# Patient Record
Sex: MALE | Race: WHITE
[De-identification: names, ages, dates, MRNs, and addresses within clinical notes are randomized per-mention and may not be internally consistent; named-entity substitution may affect disease eponyms.]

---

## 2020-07-16 ENCOUNTER — HOSPITAL ENCOUNTER (OUTPATIENT)
Dept: HOSPITAL 100 - LABSPEC | Age: 65
Discharge: HOME | End: 2020-07-16
Payer: MEDICARE

## 2020-07-16 DIAGNOSIS — R97.20: Primary | ICD-10-CM

## 2020-07-16 PROCEDURE — G0416 PROSTATE BIOPSY, ANY MTHD: HCPCS

## 2020-07-16 PROCEDURE — 88305 TISSUE EXAM BY PATHOLOGIST: CPT

## 2022-05-27 ENCOUNTER — HOSPITAL ENCOUNTER (OUTPATIENT)
Dept: HOSPITAL 100 - MRI | Age: 67
Discharge: HOME | End: 2022-05-27
Payer: MEDICARE

## 2022-05-27 DIAGNOSIS — R97.20: Primary | ICD-10-CM

## 2022-05-27 PROCEDURE — 72197 MRI PELVIS W/O & W/DYE: CPT

## 2022-05-27 PROCEDURE — A9575 INJ GADOTERATE MEGLUMI 0.1ML: HCPCS

## 2022-05-28 LAB
CREATININE FINGERSTICK: 1.1 MG/DL (ref 0.7–1.3)
EGFR FINGERSTICK: > 60 ML/MIN (ref 60–?)

## 2023-02-15 PROBLEM — R35.1 NOCTURIA: Status: ACTIVE | Noted: 2023-02-15

## 2023-02-15 PROBLEM — G89.29 CHRONIC BACK PAIN: Status: ACTIVE | Noted: 2023-02-15

## 2023-02-15 PROBLEM — M54.17 LUMBOSACRAL RADICULOPATHY: Status: ACTIVE | Noted: 2023-02-15

## 2023-02-15 PROBLEM — M47.817 LUMBOSACRAL SPONDYLOSIS: Status: ACTIVE | Noted: 2023-02-15

## 2023-02-15 PROBLEM — H69.90 EUSTACHIAN TUBE DYSFUNCTION: Status: ACTIVE | Noted: 2023-02-15

## 2023-02-15 PROBLEM — M51.26 LUMBAR DISC HERNIATION: Status: ACTIVE | Noted: 2023-02-15

## 2023-02-15 PROBLEM — N52.9 ERECTILE DYSFUNCTION: Status: ACTIVE | Noted: 2023-02-15

## 2023-02-15 PROBLEM — I10 HYPERTENSION: Status: ACTIVE | Noted: 2023-02-15

## 2023-02-15 PROBLEM — R73.03 PREDIABETES: Status: ACTIVE | Noted: 2023-02-15

## 2023-02-15 PROBLEM — R97.20 ELEVATED PROSTATE SPECIFIC ANTIGEN (PSA): Status: ACTIVE | Noted: 2023-02-15

## 2023-02-15 PROBLEM — N40.0 BPH (BENIGN PROSTATIC HYPERPLASIA): Status: ACTIVE | Noted: 2023-02-15

## 2023-02-15 PROBLEM — E78.5 HYPERLIPIDEMIA: Status: ACTIVE | Noted: 2023-02-15

## 2023-02-15 PROBLEM — M54.9 CHRONIC BACK PAIN: Status: ACTIVE | Noted: 2023-02-15

## 2023-02-15 RX ORDER — PREDNISONE 10 MG/1
10 TABLET ORAL
COMMUNITY
Start: 2022-08-16 | End: 2023-04-10 | Stop reason: ALTCHOICE

## 2023-02-15 RX ORDER — LISINOPRIL 40 MG/1
40 TABLET ORAL DAILY
COMMUNITY
Start: 2020-03-09 | End: 2023-05-26 | Stop reason: SDUPTHER

## 2023-02-15 RX ORDER — LOVASTATIN 20 MG/1
20 TABLET ORAL DAILY
COMMUNITY
Start: 2020-03-09 | End: 2023-05-26 | Stop reason: SDUPTHER

## 2023-02-15 RX ORDER — AMLODIPINE BESYLATE 2.5 MG/1
2.5 TABLET ORAL DAILY
COMMUNITY
Start: 2022-07-11 | End: 2023-08-14

## 2023-02-15 RX ORDER — TADALAFIL 20 MG/1
10 TABLET ORAL DAILY
COMMUNITY
Start: 2021-04-13 | End: 2024-01-17 | Stop reason: ALTCHOICE

## 2023-04-07 LAB
ANION GAP IN SER/PLAS: 11 MMOL/L (ref 10–20)
CALCIUM (MG/DL) IN SER/PLAS: 9.2 MG/DL (ref 8.6–10.3)
CARBON DIOXIDE, TOTAL (MMOL/L) IN SER/PLAS: 28 MMOL/L (ref 21–32)
CHLORIDE (MMOL/L) IN SER/PLAS: 105 MMOL/L (ref 98–107)
CREATININE (MG/DL) IN SER/PLAS: 1.08 MG/DL (ref 0.5–1.3)
ESTIMATED AVERAGE GLUCOSE FOR HBA1C: 143 MG/DL
GFR MALE: 75 ML/MIN/1.73M2
GLUCOSE (MG/DL) IN SER/PLAS: 118 MG/DL (ref 74–99)
HEMOGLOBIN A1C/HEMOGLOBIN TOTAL IN BLOOD: 6.6 %
POTASSIUM (MMOL/L) IN SER/PLAS: 4.5 MMOL/L (ref 3.5–5.3)
SODIUM (MMOL/L) IN SER/PLAS: 139 MMOL/L (ref 136–145)
UREA NITROGEN (MG/DL) IN SER/PLAS: 15 MG/DL (ref 6–23)

## 2023-04-10 ENCOUNTER — OFFICE VISIT (OUTPATIENT)
Dept: PRIMARY CARE | Facility: CLINIC | Age: 68
End: 2023-04-10
Payer: MEDICARE

## 2023-04-10 VITALS
DIASTOLIC BLOOD PRESSURE: 62 MMHG | SYSTOLIC BLOOD PRESSURE: 124 MMHG | HEART RATE: 64 BPM | HEIGHT: 69 IN | WEIGHT: 209.1 LBS | BODY MASS INDEX: 30.97 KG/M2

## 2023-04-10 DIAGNOSIS — I15.9 SECONDARY HYPERTENSION: Primary | ICD-10-CM

## 2023-04-10 DIAGNOSIS — E11.9 TYPE 2 DIABETES MELLITUS WITHOUT COMPLICATION, WITHOUT LONG-TERM CURRENT USE OF INSULIN (MULTI): ICD-10-CM

## 2023-04-10 DIAGNOSIS — E78.2 MIXED HYPERLIPIDEMIA: ICD-10-CM

## 2023-04-10 PROBLEM — R73.03 PREDIABETES: Status: RESOLVED | Noted: 2023-02-15 | Resolved: 2023-04-10

## 2023-04-10 PROBLEM — H69.90 EUSTACHIAN TUBE DYSFUNCTION: Status: RESOLVED | Noted: 2023-02-15 | Resolved: 2023-04-10

## 2023-04-10 PROCEDURE — 3074F SYST BP LT 130 MM HG: CPT | Performed by: INTERNAL MEDICINE

## 2023-04-10 PROCEDURE — 1036F TOBACCO NON-USER: CPT | Performed by: INTERNAL MEDICINE

## 2023-04-10 PROCEDURE — 3078F DIAST BP <80 MM HG: CPT | Performed by: INTERNAL MEDICINE

## 2023-04-10 PROCEDURE — 4010F ACE/ARB THERAPY RXD/TAKEN: CPT | Performed by: INTERNAL MEDICINE

## 2023-04-10 PROCEDURE — 1160F RVW MEDS BY RX/DR IN RCRD: CPT | Performed by: INTERNAL MEDICINE

## 2023-04-10 PROCEDURE — 1159F MED LIST DOCD IN RCRD: CPT | Performed by: INTERNAL MEDICINE

## 2023-04-10 PROCEDURE — 99214 OFFICE O/P EST MOD 30 MIN: CPT | Performed by: INTERNAL MEDICINE

## 2023-04-10 PROCEDURE — 3044F HG A1C LEVEL LT 7.0%: CPT | Performed by: INTERNAL MEDICINE

## 2023-04-10 ASSESSMENT — ENCOUNTER SYMPTOMS
ARTHRALGIAS: 0
BACK PAIN: 1
UNEXPECTED WEIGHT CHANGE: 0
SHORTNESS OF BREATH: 0
WHEEZING: 0
BLOOD IN STOOL: 0
DIARRHEA: 0
NAUSEA: 0
FATIGUE: 0
ABDOMINAL PAIN: 0
VOMITING: 0
COUGH: 0
CHEST TIGHTNESS: 0
PALPITATIONS: 0

## 2023-04-10 ASSESSMENT — PATIENT HEALTH QUESTIONNAIRE - PHQ9
1. LITTLE INTEREST OR PLEASURE IN DOING THINGS: NOT AT ALL
2. FEELING DOWN, DEPRESSED OR HOPELESS: NOT AT ALL
SUM OF ALL RESPONSES TO PHQ9 QUESTIONS 1 AND 2: 0

## 2023-04-10 NOTE — PATIENT INSTRUCTIONS
-As we discussed your hemoglobin A1c came back at 6.6 which places you into the diagnostic realm of a type II diabetic.  We discussed having you see a dietitian for diabetic diet instruction and if you change your mind about that please give us a call.  Please read the handout we gave you today on instruction with diabetes.  As you know exercise and weight loss will help your condition significantly.  Also as a type II diabetic it is important that you get a dilated eye examination once a year.  We also will be checking a urine microalbumin study just prior to your next appointment so go to the lab with a full bladder.  We will also be doing a diabetic foot examination upon arrival  -Please read the handout we gave you today on measuring blood pressure and we will see you back at your next visit

## 2023-04-10 NOTE — ASSESSMENT & PLAN NOTE
-He will continue with lovastatin 20 mg daily  -Just prior to his next follow-up visit he will get a fasting lipid profile

## 2023-04-10 NOTE — PROGRESS NOTES
Subjective   Patient ID: Silver Edouard is a 67 y.o. male who presents for Med check.  HPI  He is here today for his 9-month checkup.  He states he spent time in [place] and just returned back to [place].  We conducted a review of systems and overall he reports feeling well with the exception of his chronic low back pain.  His blood pressure was very good for us today but his monitor is showing variable readings.  We discussed how he should sit and relax for 10 to 20 minutes prior to checking his blood pressure at home and I am giving him a handout today that goes over ways to monitor.  He will let us know if the numbers are still variable.  His blood pressure monitor is an [employer] unit and just a few years old.  We also discussed his most recent laboratory test results and unfortunately his hemoglobin A1c came back in the diabetic range of 6.6.  We discussed medication and we also discussed seeing a dietitian.  He states that he is going to make some significant lifestyle modifications including watching his diet more closely and exercising more regularly.  He would like to refrain from medication at this time and we will see what happens next time.  We did talk about some of the benefits of starting metformin including taking stress off the pancreas and also helping with weight loss.  He expresses understanding.  We also briefly discussed his history of elevated PSA and he does follow closely with his urologist.  He also sees Dr. Nelson, dermatologist for regular skin checkups.  We plan on seeing him back in 6 months and he knows to call sooner if things or not going well.  Review of Systems   Constitutional:  Negative for fatigue and unexpected weight change.   Respiratory:  Negative for cough, chest tightness, shortness of breath and wheezing.    Cardiovascular:  Negative for chest pain, palpitations and leg swelling.   Gastrointestinal:  Negative for abdominal pain, blood in stool, diarrhea, nausea and vomiting.    Musculoskeletal:  Positive for back pain. Negative for arthralgias.     Objective   Physical Exam  Vitals and nursing note reviewed.   Constitutional:       General: He is not in acute distress.     Appearance: Normal appearance.   HENT:      Head: Normocephalic and atraumatic.   Eyes:      Conjunctiva/sclera: Conjunctivae normal.   Cardiovascular:      Rate and Rhythm: Normal rate and regular rhythm.      Heart sounds: Normal heart sounds.   Pulmonary:      Effort: No respiratory distress.      Breath sounds: No wheezing.   Abdominal:      Palpations: Abdomen is soft.      Tenderness: There is no abdominal tenderness. There is no guarding.   Musculoskeletal:         General: No swelling. Normal range of motion.   Skin:     General: Skin is warm and dry.   Neurological:      General: No focal deficit present.      Mental Status: He is alert and oriented to person, place, and time.   Psychiatric:         Behavior: Behavior normal.       Recent Results (from the past 168 hour(s))   Basic Metabolic Panel    Collection Time: 04/07/23  9:36 AM   Result Value Ref Range    Glucose 118 (H) 74 - 99 mg/dL    Sodium 139 136 - 145 mmol/L    Potassium 4.5 3.5 - 5.3 mmol/L    Chloride 105 98 - 107 mmol/L    Bicarbonate 28 21 - 32 mmol/L    Anion Gap 11 10 - 20 mmol/L    Urea Nitrogen 15 6 - 23 mg/dL    Creatinine 1.08 0.50 - 1.30 mg/dL    GFR MALE 75 >90 mL/min/1.73m2    Calcium 9.2 8.6 - 10.3 mg/dL   Hemoglobin A1C    Collection Time: 04/07/23  9:36 AM   Result Value Ref Range    Hemoglobin A1C 6.6 (A) %    Estimated Average Glucose 143 MG/DL       Assessment/Plan   Problem List Items Addressed This Visit          Circulatory    Hypertension - Primary     -Blood pressure was good today p.o.  -I am giving him a handout on how to monitor at home  -He will continue with amlodipine 2.5 mg daily         Relevant Orders    Follow Up In Primary Care    Basic Metabolic Panel       Endocrine/Metabolic    Type 2 diabetes mellitus  without complication, without long-term current use of insulin (CMS/[facility])     -His hemoglobin A1c was 6.6 this time which places him as a newly diagnosed type II diabetic  -I am giving him a handout that explains treatment  -We talked about seeing a dietitian for diabetic education  -He would like to refrain from medication at this time  -He will come back in 6 months for reevaluation and we will get a hemoglobin A1c and urinalysis just prior to his next visit         Relevant Orders    Follow Up In Primary Care    Hemoglobin A1C    Albumin , Urine Random       Other    Hyperlipidemia     -He will continue with lovastatin 20 mg daily  -Just prior to his next follow-up visit he will get a fasting lipid profile         Relevant Orders    Follow Up In Primary Care    Lipid panel          Anita Faustin, DO

## 2023-04-10 NOTE — ASSESSMENT & PLAN NOTE
-Blood pressure was good today p.o.  -I am giving him a handout on how to monitor at home  -He will continue with amlodipine 2.5 mg daily

## 2023-04-10 NOTE — ASSESSMENT & PLAN NOTE
-His hemoglobin A1c was 6.6 this time which places him as a newly diagnosed type II diabetic  -I am giving him a handout that explains treatment  -We talked about seeing a dietitian for diabetic education  -He would like to refrain from medication at this time  -He will come back in 6 months for reevaluation and we will get a hemoglobin A1c and urinalysis just prior to his next visit

## 2023-05-26 DIAGNOSIS — E78.2 MIXED HYPERLIPIDEMIA: Primary | ICD-10-CM

## 2023-05-26 DIAGNOSIS — I15.9 SECONDARY HYPERTENSION: ICD-10-CM

## 2023-05-26 RX ORDER — LISINOPRIL 40 MG/1
40 TABLET ORAL DAILY
Qty: 90 TABLET | Refills: 1 | Status: SHIPPED | OUTPATIENT
Start: 2023-05-26 | End: 2023-11-22 | Stop reason: SDUPTHER

## 2023-05-26 RX ORDER — LOVASTATIN 20 MG/1
20 TABLET ORAL NIGHTLY
Qty: 90 TABLET | Refills: 1 | Status: SHIPPED | OUTPATIENT
Start: 2023-05-26 | End: 2023-11-22 | Stop reason: SDUPTHER

## 2023-08-14 DIAGNOSIS — I15.9 SECONDARY HYPERTENSION: ICD-10-CM

## 2023-08-14 RX ORDER — AMLODIPINE BESYLATE 2.5 MG/1
2.5 TABLET ORAL DAILY
Qty: 90 TABLET | Refills: 1 | Status: SHIPPED | OUTPATIENT
Start: 2023-08-14 | End: 2024-02-14 | Stop reason: SDUPTHER

## 2023-10-09 ENCOUNTER — APPOINTMENT (OUTPATIENT)
Dept: PRIMARY CARE | Facility: CLINIC | Age: 68
End: 2023-10-09
Payer: MEDICARE

## 2023-11-13 ENCOUNTER — APPOINTMENT (OUTPATIENT)
Dept: PRIMARY CARE | Facility: CLINIC | Age: 68
End: 2023-11-13
Payer: MEDICARE

## 2023-11-22 ENCOUNTER — LAB (OUTPATIENT)
Dept: LAB | Facility: LAB | Age: 68
End: 2023-11-22
Payer: MEDICARE

## 2023-11-22 DIAGNOSIS — E78.2 MIXED HYPERLIPIDEMIA: ICD-10-CM

## 2023-11-22 DIAGNOSIS — N40.0 BENIGN PROSTATIC HYPERPLASIA WITHOUT LOWER URINARY TRACT SYMPTOMS: Primary | ICD-10-CM

## 2023-11-22 DIAGNOSIS — I15.9 SECONDARY HYPERTENSION: ICD-10-CM

## 2023-11-22 LAB — PSA SERPL-MCNC: 8.97 NG/ML

## 2023-11-22 PROCEDURE — 36415 COLL VENOUS BLD VENIPUNCTURE: CPT

## 2023-11-22 PROCEDURE — 84153 ASSAY OF PSA TOTAL: CPT

## 2023-11-22 RX ORDER — LISINOPRIL 40 MG/1
40 TABLET ORAL DAILY
Qty: 90 TABLET | Refills: 1 | Status: SHIPPED | OUTPATIENT
Start: 2023-11-22 | End: 2024-05-15

## 2023-11-22 RX ORDER — LOVASTATIN 20 MG/1
20 TABLET ORAL NIGHTLY
Qty: 90 TABLET | Refills: 1 | Status: SHIPPED | OUTPATIENT
Start: 2023-11-22 | End: 2024-05-15

## 2023-12-04 ENCOUNTER — APPOINTMENT (OUTPATIENT)
Dept: PRIMARY CARE | Facility: CLINIC | Age: 68
End: 2023-12-04
Payer: MEDICARE

## 2024-01-17 ENCOUNTER — ANCILLARY PROCEDURE (OUTPATIENT)
Dept: RADIOLOGY | Facility: CLINIC | Age: 69
End: 2024-01-17
Payer: MEDICARE

## 2024-01-17 ENCOUNTER — OFFICE VISIT (OUTPATIENT)
Dept: PRIMARY CARE | Facility: CLINIC | Age: 69
End: 2024-01-17
Payer: MEDICARE

## 2024-01-17 VITALS
SYSTOLIC BLOOD PRESSURE: 132 MMHG | DIASTOLIC BLOOD PRESSURE: 78 MMHG | OXYGEN SATURATION: 98 % | HEART RATE: 76 BPM | HEIGHT: 69 IN | WEIGHT: 210 LBS | BODY MASS INDEX: 31.1 KG/M2

## 2024-01-17 DIAGNOSIS — M54.6 ACUTE MIDLINE THORACIC BACK PAIN: ICD-10-CM

## 2024-01-17 DIAGNOSIS — E78.2 MIXED HYPERLIPIDEMIA: ICD-10-CM

## 2024-01-17 DIAGNOSIS — M54.6 ACUTE MIDLINE THORACIC BACK PAIN: Primary | ICD-10-CM

## 2024-01-17 DIAGNOSIS — E11.9 TYPE 2 DIABETES MELLITUS WITHOUT COMPLICATION, WITHOUT LONG-TERM CURRENT USE OF INSULIN (MULTI): ICD-10-CM

## 2024-01-17 PROBLEM — M47.817 LUMBOSACRAL SPONDYLOSIS: Status: RESOLVED | Noted: 2023-02-15 | Resolved: 2024-01-17

## 2024-01-17 PROCEDURE — 1160F RVW MEDS BY RX/DR IN RCRD: CPT | Performed by: INTERNAL MEDICINE

## 2024-01-17 PROCEDURE — 4010F ACE/ARB THERAPY RXD/TAKEN: CPT | Performed by: INTERNAL MEDICINE

## 2024-01-17 PROCEDURE — 99213 OFFICE O/P EST LOW 20 MIN: CPT | Performed by: INTERNAL MEDICINE

## 2024-01-17 PROCEDURE — 3075F SYST BP GE 130 - 139MM HG: CPT | Performed by: INTERNAL MEDICINE

## 2024-01-17 PROCEDURE — 3078F DIAST BP <80 MM HG: CPT | Performed by: INTERNAL MEDICINE

## 2024-01-17 PROCEDURE — 1159F MED LIST DOCD IN RCRD: CPT | Performed by: INTERNAL MEDICINE

## 2024-01-17 PROCEDURE — 1036F TOBACCO NON-USER: CPT | Performed by: INTERNAL MEDICINE

## 2024-01-17 PROCEDURE — 72072 X-RAY EXAM THORAC SPINE 3VWS: CPT

## 2024-01-17 PROCEDURE — 72072 X-RAY EXAM THORAC SPINE 3VWS: CPT | Performed by: RADIOLOGY

## 2024-01-17 RX ORDER — NAPROXEN 500 MG/1
500 TABLET ORAL
Qty: 20 TABLET | Refills: 0 | Status: SHIPPED | OUTPATIENT
Start: 2024-01-17 | End: 2024-01-25 | Stop reason: SDUPTHER

## 2024-01-17 RX ORDER — NAPROXEN 500 MG/1
500 TABLET ORAL
Qty: 60 TABLET | Refills: 11 | Status: SHIPPED | OUTPATIENT
Start: 2024-01-17 | End: 2024-01-17 | Stop reason: SDUPTHER

## 2024-01-17 RX ORDER — BACLOFEN 10 MG/1
10 TABLET ORAL 3 TIMES DAILY
Qty: 60 TABLET | Refills: 1 | Status: SHIPPED | OUTPATIENT
Start: 2024-01-17 | End: 2024-06-11 | Stop reason: SDUPTHER

## 2024-01-17 RX ORDER — TADALAFIL 5 MG/1
5 TABLET ORAL DAILY
COMMUNITY

## 2024-01-17 ASSESSMENT — ENCOUNTER SYMPTOMS
DIARRHEA: 0
FATIGUE: 0
CONSTIPATION: 1
WHEEZING: 0
BACK PAIN: 1
NAUSEA: 0
SHORTNESS OF BREATH: 0
ABDOMINAL PAIN: 0
COUGH: 0
VOMITING: 0
BLOOD IN STOOL: 0
PALPITATIONS: 0

## 2024-01-17 NOTE — PATIENT INSTRUCTIONS
As we discussed I sent 2 prescriptions to your pharmacy.  1 is for Naprosyn to be taken twice daily with food for the next 10 days.  Please remember not to take any over-the-counter NSAIDs with this medication.  Examples of NSAIDs are ibuprofen, Motrin, Advil, Aleve, Naprosyn, naproxen, or high-dose aspirin.  If you take these you will be overdosing on the anti-inflammatory.  You can however safely take over-the-counter Tylenol which is also known as acetaminophen.  You can purchase extra strength and take it as directed.  It is safe to combine these 2 medications if you need them  I am also recommending the lidocaine patches as directed  I have ordered an x-ray of your back and please go to them at your earliest convenience  Please call me if you are not doing well and I will contact you with the results of your x-ray  We do need to see you back in April for your general checkup and please remember to get fasting lab work done prior to that visit

## 2024-01-17 NOTE — ASSESSMENT & PLAN NOTE
-He fell on Saturday striking his back on the steps  -I am sending him for an x-ray and have agreed to contact her with results  -I am prescribing Naprosyn to be taken twice daily for the next 10 days with food  -He will take over-the-counter Tylenol as directed for additional pain relief  -He will try the over-the-counter lidocaine patches as directed  -He will take MiraLAX for constipation

## 2024-01-17 NOTE — PROGRESS NOTES
Subjective   Patient ID: Silver Edouard is a 68 y.o. male who presents for Fall (Fall on Saturday. ).  Fall  Pertinent negatives include no abdominal pain, nausea or vomiting.   He is here today for evaluation after a pretty bad fall this past Saturday.  He explains he was going out to his car and inadvertently stepped on some black ice causing him to fall backwards very forcefully hitting his mid to upper thoracic back on the stairs.  He is very sore and he moves in an antalgic way.  The good news is he is having no neurologic deficits such as numbness, tingling, or weakness of his lower extremities.  He has no evidence of saddle anesthesia.  He states he has been taking some over-the-counter ibuprofen.  We decided to do an x-ray because of his mechanism of injury and I have agreed to contact him with results.  We also spent some time discussing fall prevention and he states he will be more careful in the future.  I am also giving him Naprosyn in addition to baclofen for his spasms.  We also discussed purchasing over-the-counter lidocaine patches to apply to the area of discomfort.  He also knows he can take Tylenol in addition to the other medicines for his discomfort.  He will contact me if he is not doing well.  He also complains of some constipation and I am recommending you try over-the-counter MiraLAX.  We also briefly discussed his blood sugars which have been stable and his last hemoglobin A1c was on April 7 at 6.6.  He is scheduled to come in for follow-up in the near future.  Review of Systems   Constitutional:  Negative for fatigue.   Respiratory:  Negative for cough, shortness of breath and wheezing.    Cardiovascular:  Negative for chest pain, palpitations and leg swelling.   Gastrointestinal:  Positive for constipation. Negative for abdominal pain, blood in stool, diarrhea, nausea and vomiting.   Musculoskeletal:  Positive for back pain.     Objective   Physical Exam  Vitals and nursing note  reviewed.   Constitutional:       General: He is not in acute distress.     Appearance: Normal appearance.   HENT:      Head: Normocephalic and atraumatic.   Eyes:      Conjunctiva/sclera: Conjunctivae normal.   Cardiovascular:      Rate and Rhythm: Normal rate and regular rhythm.      Heart sounds: Normal heart sounds.   Pulmonary:      Effort: No respiratory distress.      Breath sounds: No wheezing.   Abdominal:      Palpations: Abdomen is soft.      Tenderness: There is no abdominal tenderness. There is no guarding.   Musculoskeletal:         General: No swelling. Normal range of motion.   Skin:     General: Skin is warm and dry.   Neurological:      General: No focal deficit present.      Mental Status: He is alert and oriented to person, place, and time.   Psychiatric:         Behavior: Behavior normal.       Assessment/Plan   Problem List Items Addressed This Visit             ICD-10-CM    Hyperlipidemia E78.5    Relevant Orders    Lipid Panel    Type 2 diabetes mellitus without complication, without long-term current use of insulin (CMS/[facility]) E11.9     -His sugars have been stable and his last hemoglobin A1c was 6.6 on April 7.  He does have a follow-up visit         Relevant Orders    Basic Metabolic Panel    Hemoglobin A1C    Acute midline thoracic back pain - Primary M54.6     -He fell on Saturday striking his back on the steps  -I am sending him for an x-ray and have agreed to contact her with results  -I am prescribing Naprosyn to be taken twice daily for the next 10 days with food  -He will take over-the-counter Tylenol as directed for additional pain relief  -He will try the over-the-counter lidocaine patches as directed  -He will take MiraLAX for constipation         Relevant Medications    baclofen (Lioresal) 10 mg tablet    naproxen (Naprosyn) 500 mg tablet    Other Relevant Orders    XR thoracic spine 3 views          Anita Faustin DO

## 2024-01-17 NOTE — ASSESSMENT & PLAN NOTE
-His sugars have been stable and his last hemoglobin A1c was 6.6 on April 7.  He does have a follow-up visit

## 2024-01-19 NOTE — RESULT ENCOUNTER NOTE
Please call Max and let him know that the x-ray of his lower back looked okay in regards to no signs of severe arthritis or fractures.  Please ask him to call us in about a week to let us know if his symptoms are resolving or not.  Thank you!

## 2024-01-25 ENCOUNTER — TELEPHONE (OUTPATIENT)
Dept: PRIMARY CARE | Facility: CLINIC | Age: 69
End: 2024-01-25
Payer: MEDICARE

## 2024-01-25 DIAGNOSIS — M54.6 ACUTE MIDLINE THORACIC BACK PAIN: ICD-10-CM

## 2024-01-25 RX ORDER — NAPROXEN 500 MG/1
500 TABLET ORAL
Qty: 20 TABLET | Refills: 0 | Status: SHIPPED | OUTPATIENT
Start: 2024-01-25 | End: 2024-02-04

## 2024-01-25 NOTE — TELEPHONE ENCOUNTER
Patient called and is requesting a refill of his naproxen. States that he is still having back pain and is asking for 1 more refill.

## 2024-02-14 DIAGNOSIS — I15.9 SECONDARY HYPERTENSION: ICD-10-CM

## 2024-02-14 RX ORDER — AMLODIPINE BESYLATE 2.5 MG/1
2.5 TABLET ORAL DAILY
Qty: 90 TABLET | Refills: 1 | Status: SHIPPED | OUTPATIENT
Start: 2024-02-14 | End: 2024-06-11 | Stop reason: SDUPTHER

## 2024-04-29 ENCOUNTER — APPOINTMENT (OUTPATIENT)
Dept: PRIMARY CARE | Facility: CLINIC | Age: 69
End: 2024-04-29
Payer: MEDICARE

## 2024-05-10 DIAGNOSIS — I15.9 SECONDARY HYPERTENSION: ICD-10-CM

## 2024-05-10 DIAGNOSIS — E78.2 MIXED HYPERLIPIDEMIA: ICD-10-CM

## 2024-05-15 RX ORDER — LISINOPRIL 40 MG/1
40 TABLET ORAL DAILY
Qty: 90 TABLET | Refills: 0 | Status: SHIPPED | OUTPATIENT
Start: 2024-05-15 | End: 2024-06-11 | Stop reason: SDUPTHER

## 2024-05-15 RX ORDER — LOVASTATIN 20 MG/1
20 TABLET ORAL NIGHTLY
Qty: 90 TABLET | Refills: 0 | Status: SHIPPED | OUTPATIENT
Start: 2024-05-15 | End: 2024-06-11 | Stop reason: SDUPTHER

## 2024-05-21 ENCOUNTER — APPOINTMENT (OUTPATIENT)
Dept: PRIMARY CARE | Facility: CLINIC | Age: 69
End: 2024-05-21
Payer: MEDICARE

## 2024-06-07 ENCOUNTER — LAB (OUTPATIENT)
Dept: LAB | Facility: LAB | Age: 69
End: 2024-06-07
Payer: MEDICARE

## 2024-06-07 DIAGNOSIS — E11.9 TYPE 2 DIABETES MELLITUS WITHOUT COMPLICATION, WITHOUT LONG-TERM CURRENT USE OF INSULIN (MULTI): ICD-10-CM

## 2024-06-07 DIAGNOSIS — E78.2 MIXED HYPERLIPIDEMIA: ICD-10-CM

## 2024-06-07 LAB
ANION GAP SERPL CALC-SCNC: 12 MMOL/L (ref 10–20)
BUN SERPL-MCNC: 28 MG/DL (ref 6–23)
CALCIUM SERPL-MCNC: 9.4 MG/DL (ref 8.6–10.3)
CHLORIDE SERPL-SCNC: 106 MMOL/L (ref 98–107)
CHOLEST SERPL-MCNC: 138 MG/DL (ref 0–199)
CHOLESTEROL/HDL RATIO: 3.5
CO2 SERPL-SCNC: 23 MMOL/L (ref 21–32)
CREAT SERPL-MCNC: 1 MG/DL (ref 0.5–1.3)
EGFRCR SERPLBLD CKD-EPI 2021: 81 ML/MIN/1.73M*2
EST. AVERAGE GLUCOSE BLD GHB EST-MCNC: 134 MG/DL
GLUCOSE SERPL-MCNC: 105 MG/DL (ref 74–99)
HBA1C MFR BLD: 6.3 %
HDLC SERPL-MCNC: 39 MG/DL
LDLC SERPL CALC-MCNC: 72 MG/DL
NON HDL CHOLESTEROL: 99 MG/DL (ref 0–149)
POTASSIUM SERPL-SCNC: 4.7 MMOL/L (ref 3.5–5.3)
SODIUM SERPL-SCNC: 136 MMOL/L (ref 136–145)
TRIGL SERPL-MCNC: 136 MG/DL (ref 0–149)
VLDL: 27 MG/DL (ref 0–40)

## 2024-06-07 PROCEDURE — 83036 HEMOGLOBIN GLYCOSYLATED A1C: CPT

## 2024-06-07 PROCEDURE — 36415 COLL VENOUS BLD VENIPUNCTURE: CPT

## 2024-06-07 PROCEDURE — 80061 LIPID PANEL: CPT

## 2024-06-07 PROCEDURE — 80048 BASIC METABOLIC PNL TOTAL CA: CPT

## 2024-06-11 ENCOUNTER — OFFICE VISIT (OUTPATIENT)
Dept: PRIMARY CARE | Facility: CLINIC | Age: 69
End: 2024-06-11
Payer: MEDICARE

## 2024-06-11 VITALS
SYSTOLIC BLOOD PRESSURE: 112 MMHG | HEART RATE: 82 BPM | BODY MASS INDEX: 30.36 KG/M2 | HEIGHT: 69 IN | WEIGHT: 205 LBS | OXYGEN SATURATION: 98 % | DIASTOLIC BLOOD PRESSURE: 68 MMHG

## 2024-06-11 DIAGNOSIS — G89.29 CHRONIC BILATERAL LOW BACK PAIN WITH BILATERAL SCIATICA: ICD-10-CM

## 2024-06-11 DIAGNOSIS — Z00.00 MEDICARE ANNUAL WELLNESS VISIT, SUBSEQUENT: Primary | ICD-10-CM

## 2024-06-11 DIAGNOSIS — R53.1 WEAKNESS: ICD-10-CM

## 2024-06-11 DIAGNOSIS — E11.9 TYPE 2 DIABETES MELLITUS WITHOUT COMPLICATION, WITHOUT LONG-TERM CURRENT USE OF INSULIN (MULTI): ICD-10-CM

## 2024-06-11 DIAGNOSIS — Z12.11 ENCOUNTER FOR SCREENING FOR MALIGNANT NEOPLASM OF COLON: ICD-10-CM

## 2024-06-11 DIAGNOSIS — M54.42 CHRONIC BILATERAL LOW BACK PAIN WITH BILATERAL SCIATICA: ICD-10-CM

## 2024-06-11 DIAGNOSIS — M54.41 CHRONIC BILATERAL LOW BACK PAIN WITH BILATERAL SCIATICA: ICD-10-CM

## 2024-06-11 DIAGNOSIS — Z12.11 COLON CANCER SCREENING: ICD-10-CM

## 2024-06-11 DIAGNOSIS — E78.2 MIXED HYPERLIPIDEMIA: ICD-10-CM

## 2024-06-11 DIAGNOSIS — M54.6 ACUTE MIDLINE THORACIC BACK PAIN: ICD-10-CM

## 2024-06-11 DIAGNOSIS — I15.9 SECONDARY HYPERTENSION: ICD-10-CM

## 2024-06-11 PROBLEM — R35.1 NOCTURIA: Status: RESOLVED | Noted: 2023-02-15 | Resolved: 2024-06-11

## 2024-06-11 PROBLEM — M54.17 LUMBOSACRAL RADICULOPATHY: Status: RESOLVED | Noted: 2023-02-15 | Resolved: 2024-06-11

## 2024-06-11 PROCEDURE — 99214 OFFICE O/P EST MOD 30 MIN: CPT | Performed by: INTERNAL MEDICINE

## 2024-06-11 PROCEDURE — G0439 PPPS, SUBSEQ VISIT: HCPCS | Performed by: INTERNAL MEDICINE

## 2024-06-11 PROCEDURE — 3044F HG A1C LEVEL LT 7.0%: CPT | Performed by: INTERNAL MEDICINE

## 2024-06-11 PROCEDURE — 4010F ACE/ARB THERAPY RXD/TAKEN: CPT | Performed by: INTERNAL MEDICINE

## 2024-06-11 PROCEDURE — 1157F ADVNC CARE PLAN IN RCRD: CPT | Performed by: INTERNAL MEDICINE

## 2024-06-11 PROCEDURE — 1124F ACP DISCUSS-NO DSCNMKR DOCD: CPT | Performed by: INTERNAL MEDICINE

## 2024-06-11 PROCEDURE — 1036F TOBACCO NON-USER: CPT | Performed by: INTERNAL MEDICINE

## 2024-06-11 PROCEDURE — 3048F LDL-C <100 MG/DL: CPT | Performed by: INTERNAL MEDICINE

## 2024-06-11 PROCEDURE — 1170F FXNL STATUS ASSESSED: CPT | Performed by: INTERNAL MEDICINE

## 2024-06-11 PROCEDURE — 1160F RVW MEDS BY RX/DR IN RCRD: CPT | Performed by: INTERNAL MEDICINE

## 2024-06-11 PROCEDURE — 1159F MED LIST DOCD IN RCRD: CPT | Performed by: INTERNAL MEDICINE

## 2024-06-11 PROCEDURE — 3078F DIAST BP <80 MM HG: CPT | Performed by: INTERNAL MEDICINE

## 2024-06-11 PROCEDURE — 3074F SYST BP LT 130 MM HG: CPT | Performed by: INTERNAL MEDICINE

## 2024-06-11 RX ORDER — BACLOFEN 10 MG/1
10 TABLET ORAL 3 TIMES DAILY
Qty: 50 TABLET | Refills: 0 | Status: SHIPPED | OUTPATIENT
Start: 2024-06-11 | End: 2024-06-12 | Stop reason: WASHOUT

## 2024-06-11 RX ORDER — LOVASTATIN 20 MG/1
20 TABLET ORAL NIGHTLY
Qty: 90 TABLET | Refills: 1 | Status: SHIPPED | OUTPATIENT
Start: 2024-06-11

## 2024-06-11 RX ORDER — NAPROXEN 500 MG/1
500 TABLET ORAL
Qty: 20 TABLET | Refills: 0 | Status: SHIPPED | OUTPATIENT
Start: 2024-06-11 | End: 2024-06-21

## 2024-06-11 RX ORDER — LISINOPRIL 40 MG/1
40 TABLET ORAL DAILY
Qty: 90 TABLET | Refills: 1 | Status: SHIPPED | OUTPATIENT
Start: 2024-06-11

## 2024-06-11 RX ORDER — BACLOFEN 10 MG/1
10 TABLET ORAL 3 TIMES DAILY
Qty: 60 TABLET | Refills: 1 | Status: SHIPPED | OUTPATIENT
Start: 2024-06-11 | End: 2024-12-08

## 2024-06-11 RX ORDER — AMLODIPINE BESYLATE 2.5 MG/1
2.5 TABLET ORAL DAILY
Qty: 90 TABLET | Refills: 1 | Status: SHIPPED | OUTPATIENT
Start: 2024-06-11

## 2024-06-11 ASSESSMENT — ENCOUNTER SYMPTOMS
ARTHRALGIAS: 0
WHEEZING: 0
FATIGUE: 0
BACK PAIN: 1
BLOOD IN STOOL: 0
NAUSEA: 0
PALPITATIONS: 0
ABDOMINAL PAIN: 0
VOMITING: 0
COUGH: 0
DIARRHEA: 0
SHORTNESS OF BREATH: 0

## 2024-06-11 ASSESSMENT — ACTIVITIES OF DAILY LIVING (ADL)
DOING_HOUSEWORK: INDEPENDENT
BATHING: INDEPENDENT
TAKING_MEDICATION: INDEPENDENT
DRESSING: INDEPENDENT
MANAGING_FINANCES: INDEPENDENT
GROCERY_SHOPPING: INDEPENDENT

## 2024-06-11 ASSESSMENT — PATIENT HEALTH QUESTIONNAIRE - PHQ9
2. FEELING DOWN, DEPRESSED OR HOPELESS: NOT AT ALL
SUM OF ALL RESPONSES TO PHQ9 QUESTIONS 1 AND 2: 0
1. LITTLE INTEREST OR PLEASURE IN DOING THINGS: NOT AT ALL

## 2024-06-11 NOTE — ASSESSMENT & PLAN NOTE
-His last colonoscopy was done by Dr. Albert in July 2021 and because of abnormal findings he was advised to repeat it again in 3 years.  We will help facilitate a referral

## 2024-06-11 NOTE — PATIENT INSTRUCTIONS
As we discussed I am pleased with your checkup today in the way of your lab work and blood pressure.  Please keep up the great work with your healthy lifestyle habits  Please remember to put grab bars in her bathroom  Because of your back issues I have requested an MRI of your lumbar spine and the staff will be contacting you as soon as they get determination from Medicare.  My hope is that we can complete this study and I can see you back in the next couple of weeks  I sent a prescription to your pharmacy for Naprosyn and please take this twice a day with food for the next 10 days  I also gave you a prescription for a muscle relaxer but be careful when using this medication because sometimes it can cause you to feel weak and you should not operate heavy machinery while on this medicine  Please remember to complete your power of  for healthcare and your living will.  These documents will then need to be notarized and we do have a notary here at the office.  We would like a copy for your chart  I will schedule a follow-up visit in 3 weeks in the hopes that we get everything done by the time we see you back

## 2024-06-11 NOTE — PROGRESS NOTES
Subjective   Reason for Visit: Silver Edouard is an 69 y.o. male here for a Medicare Wellness visit.     Reviewed all medications by prescribing practitioner or clinical pharmacist (such as prescriptions, OTCs, herbal therapies and supplements) and documented in the medical record.    HPI  He is here today for a routine checkup and we also took this opportunity to complete his annual Medicare wellness visit.  He denies any symptoms of depression and his only fall was back in January when he slipped on the ice.  He actually fell landing on his buttocks and really nick his back.  He states to this today he is still having some discomfort and he feels muscle tightening.  He points to the mid thoracic region laterally.  He did have x-rays at the time and no acute findings were identified.  He also continues to have issues with his low back pain and he states more recently he sometimes develops weakness in his legs.  When I examined him today his deep tendon reflexes are intact and symmetrical but he appears to have some weakness on flexion.  Because of the neurologic issues he describes I will request that we do an MRI of his lumbar spine.  He has had history of disc herniation and repeated sciatica in the past.  I told him we will get back with him as soon as we get determination from Medicare.  In the meantime I will place him on an anti-inflammatory and I am also giving him a muscle relaxer.  We talked about fall prevention for the future and he has taken measures to reduce risk of falls in his home.  He states he will be putting grab bars in his bathroom soon.  He also appears to have great memory and today he is hearing me well.  He also states he tries to exercise regularly.  He states he also has been working on establishing his advance directives and once they are completed he will provide a copy for us.  We did conduct a review of systems we also went over the results of lab work.  He has been working hard  "with his diet routine and it shows.  His hemoglobin A1c came back lower at 6.3 which is excellent.  Also his cholesterol profile improved dramatically.  His kidney function appears to be well within desirable range.  We will summarize everything in a problem based format.      Patient Care Team:  Anita Faustin DO as PCP - General  Anita Faustin DO as PCP - [facility] Attributed Provider     Review of Systems   Constitutional:  Negative for fatigue.   Respiratory:  Negative for cough, shortness of breath and wheezing.    Cardiovascular:  Negative for chest pain, palpitations and leg swelling.   Gastrointestinal:  Negative for abdominal pain, blood in stool, diarrhea, nausea and vomiting.   Musculoskeletal:  Positive for back pain. Negative for arthralgias.       Objective   Vitals:  /68   Pulse 82   Ht 1.753 m (5' 9\")   Wt 93 kg (205 lb)   SpO2 98%   BMI 30.27 kg/m²       Physical Exam  Vitals and nursing note reviewed.   Constitutional:       General: He is not in acute distress.     Appearance: Normal appearance.   HENT:      Head: Normocephalic and atraumatic.   Eyes:      Conjunctiva/sclera: Conjunctivae normal.   Cardiovascular:      Rate and Rhythm: Normal rate and regular rhythm.      Heart sounds: Normal heart sounds.   Pulmonary:      Effort: No respiratory distress.      Breath sounds: No wheezing.   Abdominal:      Palpations: Abdomen is soft.      Tenderness: There is no abdominal tenderness. There is no guarding.   Musculoskeletal:         General: No swelling. Normal range of motion.   Skin:     General: Skin is warm and dry.   Neurological:      General: No focal deficit present.      Mental Status: He is alert and oriented to person, place, and time.   Psychiatric:         Behavior: Behavior normal.       Recent Results (from the past 672 hour(s))   Basic Metabolic Panel    Collection Time: 06/07/24 10:19 AM   Result Value Ref Range    Glucose 105 (H) 74 - 99 mg/dL    Sodium 136 136 - " 145 mmol/L    Potassium 4.7 3.5 - 5.3 mmol/L    Chloride 106 98 - 107 mmol/L    Bicarbonate 23 21 - 32 mmol/L    Anion Gap 12 10 - 20 mmol/L    Urea Nitrogen 28 (H) 6 - 23 mg/dL    Creatinine 1.00 0.50 - 1.30 mg/dL    eGFR 81 >60 mL/min/1.73m*2    Calcium 9.4 8.6 - 10.3 mg/dL   Hemoglobin A1C    Collection Time: 06/07/24 10:19 AM   Result Value Ref Range    Hemoglobin A1C 6.3 (H) see below %    Estimated Average Glucose 134 Not Established mg/dL   Lipid Panel    Collection Time: 06/07/24 10:19 AM   Result Value Ref Range    Cholesterol 138 0 - 199 mg/dL    HDL-Cholesterol 39.0 mg/dL    Cholesterol/HDL Ratio 3.5     LDL Calculated 72 <=99 mg/dL    VLDL 27 0 - 40 mg/dL    Triglycerides 136 0 - 149 mg/dL    Non HDL Cholesterol 99 0 - 149 mg/dL       Assessment/Plan   Problem List Items Addressed This Visit       Chronic back pain    Current Assessment & Plan     -We will give him Naprosyn twice a day for the next 10 days and a muscle relaxer         Hyperlipidemia    Current Assessment & Plan     -His cholesterol profile has improved significantly and he will continue with his current cholesterol-lowering medication as well as his healthy lifestyle practices  -We will check his cholesterol again in 1 year per Medicare protocol         Relevant Medications    lovastatin (Mevacor) 20 mg tablet    Hypertension    Current Assessment & Plan     -His blood pressure is excellent today we will continue with his current antihypertensive regimen         Relevant Medications    amLODIPine (Norvasc) 2.5 mg tablet    lisinopril 40 mg tablet    Type 2 diabetes mellitus without complication, without long-term current use of insulin ([facility])    Current Assessment & Plan     -He has done remarkably well and has not required glucose lowering medication  -His most recent hemoglobin A1c was good at 6.3 we will check this twice a year         Acute midline thoracic back pain    Current Assessment & Plan     -He does describe experiencing  weakness in his legs and because of that I will request an MRI.         Relevant Medications    naproxen (Naprosyn) 500 mg tablet    baclofen (Lioresal) 10 mg tablet    baclofen (Lioresal) 10 mg tablet    Other Relevant Orders    MR lumbar spine w and wo IV contrast    Encounter for screening for malignant neoplasm of colon - Primary    Current Assessment & Plan     -His last colonoscopy was done by Dr. Albert in July 2021 and because of abnormal findings he was advised to repeat it again in 3 years.  We will help facilitate a referral         Relevant Orders    Referral to Gastroenterology    Weakness

## 2024-06-11 NOTE — ASSESSMENT & PLAN NOTE
-His cholesterol profile has improved significantly and he will continue with his current cholesterol-lowering medication as well as his healthy lifestyle practices  -We will check his cholesterol again in 1 year per Medicare protocol

## 2024-06-11 NOTE — ASSESSMENT & PLAN NOTE
-He has done remarkably well and has not required glucose lowering medication  -His most recent hemoglobin A1c was good at 6.3 we will check this twice a year

## 2024-06-12 ENCOUNTER — TELEPHONE (OUTPATIENT)
Dept: PRIMARY CARE | Facility: CLINIC | Age: 69
End: 2024-06-12
Payer: MEDICARE

## 2024-06-12 NOTE — TELEPHONE ENCOUNTER
Please advise that I am sorry for the duplication and I went ahead and sent to discontinue one of them.  (I think that is one of the situations where it comes from the pharmacy for medication reconciliation and I am afraid not to approve it and therefore that is why they get duplicates)  Thank you

## 2024-06-12 NOTE — TELEPHONE ENCOUNTER
[facility] called and left VM. Two scripts came over for the baclofen. They would like to know which one you want them to fill?

## 2024-06-26 ENCOUNTER — HOSPITAL ENCOUNTER (OUTPATIENT)
Dept: RADIOLOGY | Facility: HOSPITAL | Age: 69
Discharge: HOME | End: 2024-06-26
Payer: MEDICARE

## 2024-06-26 DIAGNOSIS — M54.6 ACUTE MIDLINE THORACIC BACK PAIN: ICD-10-CM

## 2024-06-26 PROCEDURE — A9575 INJ GADOTERATE MEGLUMI 0.1ML: HCPCS | Performed by: INTERNAL MEDICINE

## 2024-06-26 PROCEDURE — 2500000004 HC RX 250 GENERAL PHARMACY W/ HCPCS (ALT 636 FOR OP/ED): Performed by: INTERNAL MEDICINE

## 2024-06-26 PROCEDURE — 72158 MRI LUMBAR SPINE W/O & W/DYE: CPT

## 2024-06-26 RX ORDER — GADOTERATE MEGLUMINE 376.9 MG/ML
18 INJECTION INTRAVENOUS
Status: COMPLETED | OUTPATIENT
Start: 2024-06-26 | End: 2024-06-26

## 2024-06-27 DIAGNOSIS — M48.062 SPINAL STENOSIS OF LUMBAR REGION WITH NEUROGENIC CLAUDICATION: Primary | ICD-10-CM

## 2024-06-27 NOTE — RESULT ENCOUNTER NOTE
I have requested an appointment with neurosurgery and I did call [facility] this evening to discuss his MRI results.  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  I told him I would like to refer him to a neurosurgeon and he expresses understanding and is awaiting our call

## 2024-07-02 ENCOUNTER — APPOINTMENT (OUTPATIENT)
Dept: PRIMARY CARE | Facility: CLINIC | Age: 69
End: 2024-07-02
Payer: MEDICARE

## 2024-07-02 VITALS
SYSTOLIC BLOOD PRESSURE: 128 MMHG | BODY MASS INDEX: 29.62 KG/M2 | WEIGHT: 200 LBS | HEIGHT: 69 IN | DIASTOLIC BLOOD PRESSURE: 80 MMHG | OXYGEN SATURATION: 95 % | HEART RATE: 63 BPM

## 2024-07-02 DIAGNOSIS — M48.061 SPINAL STENOSIS AT L4-L5 LEVEL: Primary | ICD-10-CM

## 2024-07-02 PROBLEM — R53.1 WEAKNESS: Status: RESOLVED | Noted: 2024-06-11 | Resolved: 2024-07-02

## 2024-07-02 PROBLEM — M51.26 LUMBAR DISC HERNIATION: Status: RESOLVED | Noted: 2023-02-15 | Resolved: 2024-07-02

## 2024-07-02 PROCEDURE — 3048F LDL-C <100 MG/DL: CPT | Performed by: INTERNAL MEDICINE

## 2024-07-02 PROCEDURE — 1159F MED LIST DOCD IN RCRD: CPT | Performed by: INTERNAL MEDICINE

## 2024-07-02 PROCEDURE — 3074F SYST BP LT 130 MM HG: CPT | Performed by: INTERNAL MEDICINE

## 2024-07-02 PROCEDURE — 3044F HG A1C LEVEL LT 7.0%: CPT | Performed by: INTERNAL MEDICINE

## 2024-07-02 PROCEDURE — 1157F ADVNC CARE PLAN IN RCRD: CPT | Performed by: INTERNAL MEDICINE

## 2024-07-02 PROCEDURE — 4010F ACE/ARB THERAPY RXD/TAKEN: CPT | Performed by: INTERNAL MEDICINE

## 2024-07-02 PROCEDURE — 1036F TOBACCO NON-USER: CPT | Performed by: INTERNAL MEDICINE

## 2024-07-02 PROCEDURE — 3079F DIAST BP 80-89 MM HG: CPT | Performed by: INTERNAL MEDICINE

## 2024-07-02 PROCEDURE — 99213 OFFICE O/P EST LOW 20 MIN: CPT | Performed by: INTERNAL MEDICINE

## 2024-07-02 NOTE — PROGRESS NOTES
"Subjective   Patient ID: Silver Edouard is a 69 y.o. male who presents for Follow-up (3 week FUV).  HPI  He is here today for follow-up.  Because of some severe low back pain we did an x-ray and subsequently ended up ordering an MRI.  The MRI revealed multilevel degenerative changes of the lumbar spine most prominent  L4-5 where there is grade 1 anterolisthesis and superimposed  degenerative change resulting in severe canal stenosis and  mass-effect on the cauda equina nerve roots.  We called him immediately and I recommended that he see a neurosurgeon.  He is here today for follow-up and he explains that he is not really having too much back discomfort at this time.  He states his only real symptom is that of numbness in his left big toe.  He states he is not having any weakness at this time and no severe pain.  He is having no bowel or bladder dysfunction.  He states he has had a long history of back issues and he recalls that back in 2009 while in [place] he saw a neurosurgeon in consultation.  He states his back was \"bad then \"but he did not require any surgery.  In fact he was prescribed Relafen and he did well for a long time.  He then started having issues again back in 2022 and was seen at the pain clinic.  He received some oral steroids but again did not require any surgery.  He states that he would be fine going back to the pain clinic to see what they think and I agree.  I told him I am surprised he is not having more symptoms based on the description of the MRI.  Because he is relatively asymptomatic at this time I am not ordering steroids.  Also of note he states he feels like the fall he had back in January may have \"stirred things up \".  We talked about the extreme importance of avoiding any falls in the future and of course if he develops sudden severe back pain he is instructed to go to the emergency room.  Objective   Physical Exam  Vitals and nursing note reviewed.   Constitutional:      "  General: He is not in acute distress.     Appearance: Normal appearance.   HENT:      Head: Normocephalic and atraumatic.   Eyes:      Conjunctiva/sclera: Conjunctivae normal.   Cardiovascular:      Rate and Rhythm: Normal rate and regular rhythm.      Heart sounds: Normal heart sounds.   Pulmonary:      Effort: No respiratory distress.      Breath sounds: No wheezing.   Abdominal:      Palpations: Abdomen is soft.      Tenderness: There is no abdominal tenderness. There is no guarding.   Musculoskeletal:         General: No swelling. Normal range of motion.   Skin:     General: Skin is warm and dry.   Neurological:      General: No focal deficit present.      Mental Status: He is alert and oriented to person, place, and time.   Psychiatric:         Behavior: Behavior normal.       Assessment/Plan   Problem List Items Addressed This Visit             ICD-10-CM    Spinal stenosis at L4-L5 level - Primary M48.061     -Recent MRI reveals severe canal stenosis of the L4-L5 region with grade 1 anterior listhesis and superimposed degenerative changes resulting in severe canal stenosis and mass effect on the cauda equina nerve roots  -He is relatively asymptomatic at this time so I am going to refer him to the pain clinic for further evaluation           Relevant Orders    Referral to Pain Medicine          Anita S RoyalDO

## 2024-07-02 NOTE — PATIENT INSTRUCTIONS
As we discussed we will cancel the consultation with neurosurgery since you are having minimal symptoms at this time.  The staff will be calling you about scheduling an appointment with the pain clinic and please understand if you develop sudden severe back pain we need to seek medical attention immediately.

## 2024-07-02 NOTE — ASSESSMENT & PLAN NOTE
-Recent MRI reveals severe canal stenosis of the L4-L5 region with grade 1 anterior listhesis and superimposed degenerative changes resulting in severe canal stenosis and mass effect on the cauda equina nerve roots  -He is relatively asymptomatic at this time so I am going to refer him to the pain clinic for further evaluation

## 2024-07-24 ENCOUNTER — HOSPITAL ENCOUNTER (OUTPATIENT)
Dept: GASTROENTEROLOGY | Facility: CLINIC | Age: 69
Setting detail: OUTPATIENT SURGERY
Discharge: HOME | End: 2024-07-24
Payer: MEDICARE

## 2024-07-24 VITALS
SYSTOLIC BLOOD PRESSURE: 131 MMHG | HEIGHT: 70 IN | HEART RATE: 52 BPM | OXYGEN SATURATION: 98 % | TEMPERATURE: 98 F | BODY MASS INDEX: 28.09 KG/M2 | RESPIRATION RATE: 16 BRPM | WEIGHT: 196.2 LBS | DIASTOLIC BLOOD PRESSURE: 77 MMHG

## 2024-07-24 DIAGNOSIS — Z12.11 COLON CANCER SCREENING: ICD-10-CM

## 2024-07-24 DIAGNOSIS — Z12.11 ENCOUNTER FOR SCREENING FOR MALIGNANT NEOPLASM OF COLON: ICD-10-CM

## 2024-07-24 PROCEDURE — 2500000004 HC RX 250 GENERAL PHARMACY W/ HCPCS (ALT 636 FOR OP/ED): Performed by: INTERNAL MEDICINE

## 2024-07-24 PROCEDURE — 7100000010 HC PHASE TWO TIME - EACH INCREMENTAL 1 MINUTE

## 2024-07-24 PROCEDURE — 7100000009 HC PHASE TWO TIME - INITIAL BASE CHARGE

## 2024-07-24 PROCEDURE — 3700000012 HC SEDATION LEVEL 5+ TIME - INITIAL 15 MINUTES 5/> YEARS

## 2024-07-24 PROCEDURE — G0500 MOD SEDAT ENDO SERVICE >5YRS: HCPCS | Performed by: INTERNAL MEDICINE

## 2024-07-24 PROCEDURE — G0121 COLON CA SCRN NOT HI RSK IND: HCPCS | Performed by: INTERNAL MEDICINE

## 2024-07-24 PROCEDURE — 45378 DIAGNOSTIC COLONOSCOPY: CPT | Performed by: INTERNAL MEDICINE

## 2024-07-24 RX ORDER — MIDAZOLAM HYDROCHLORIDE 5 MG/ML
INJECTION, SOLUTION INTRAMUSCULAR; INTRAVENOUS AS NEEDED
Status: COMPLETED | OUTPATIENT
Start: 2024-07-24 | End: 2024-07-24

## 2024-07-24 RX ORDER — SODIUM CHLORIDE, SODIUM LACTATE, POTASSIUM CHLORIDE, CALCIUM CHLORIDE 600; 310; 30; 20 MG/100ML; MG/100ML; MG/100ML; MG/100ML
20 INJECTION, SOLUTION INTRAVENOUS CONTINUOUS
Status: DISCONTINUED | OUTPATIENT
Start: 2024-07-24 | End: 2024-07-25 | Stop reason: HOSPADM

## 2024-07-24 RX ORDER — MEPERIDINE HYDROCHLORIDE 25 MG/ML
INJECTION INTRAMUSCULAR; INTRAVENOUS; SUBCUTANEOUS AS NEEDED
Status: COMPLETED | OUTPATIENT
Start: 2024-07-24 | End: 2024-07-24

## 2024-07-24 ASSESSMENT — PAIN SCALES - GENERAL
PAINLEVEL_OUTOF10: 0 - NO PAIN

## 2024-07-24 ASSESSMENT — COLUMBIA-SUICIDE SEVERITY RATING SCALE - C-SSRS
1. IN THE PAST MONTH, HAVE YOU WISHED YOU WERE DEAD OR WISHED YOU COULD GO TO SLEEP AND NOT WAKE UP?: NO
6. HAVE YOU EVER DONE ANYTHING, STARTED TO DO ANYTHING, OR PREPARED TO DO ANYTHING TO END YOUR LIFE?: NO
2. HAVE YOU ACTUALLY HAD ANY THOUGHTS OF KILLING YOURSELF?: NO

## 2024-07-24 ASSESSMENT — PAIN - FUNCTIONAL ASSESSMENT
PAIN_FUNCTIONAL_ASSESSMENT: 0-10
PAIN_FUNCTIONAL_ASSESSMENT: 0-10

## 2024-07-24 NOTE — H&P
History Of Present Illness  Silver Edouard is a 69 y.o. male presenting with colon cancer screening.     Past Medical History  Past Medical History:   Diagnosis Date    Other hyperlipidemia 06/19/2020    Other hyperlipidemia    Personal history of other diseases of the circulatory system 06/19/2020    History of hypertension    Personal history of other endocrine, nutritional and metabolic disease 06/19/2020    History of hyperglycemia    Personal history of other specified conditions 05/29/2020    History of fatigue    Pure hyperglyceridemia 12/15/2020    Hypertriglyceridemia     Surgical History  Past Surgical History:   Procedure Laterality Date    OTHER SURGICAL HISTORY  03/12/2020    Tonsillectomy    OTHER SURGICAL HISTORY  03/12/2020    Uvuloplasty    OTHER SURGICAL HISTORY  12/15/2020    Cyst excision    OTHER SURGICAL HISTORY  12/15/2020    Prostate biopsy     Social History  He reports that he has quit smoking. His smoking use included cigarettes. He has quit using smokeless tobacco. He reports that he does not currently use alcohol. He reports that he does not use drugs.    Family History  Family History   Problem Relation Name Age of Onset    Valvular heart disease Mother      Dementia Father      Hypertension Father          Allergies  No Known Allergies  Review of Systems  Pre-sedation Evaluation:  ASA Classification - ASA 2 - Patient with mild systemic disease with no functional limitations  Mallampati Score - II (hard and soft palate, upper portion of tonsils and uvula visible)    Physical Exam  Vitals and nursing note reviewed.   Constitutional:       Appearance: Normal appearance.   HENT:      Head: Normocephalic.      Mouth/Throat:      Mouth: Mucous membranes are moist.      Pharynx: Oropharynx is clear.   Eyes:      Pupils: Pupils are equal, round, and reactive to light.   Cardiovascular:      Rate and Rhythm: Normal rate and regular rhythm.      Heart sounds: Normal heart sounds.   Pulmonary:       Effort: Pulmonary effort is normal.      Breath sounds: Normal breath sounds.   Abdominal:      General: Abdomen is flat. Bowel sounds are normal.      Palpations: Abdomen is soft.   Musculoskeletal:         General: Normal range of motion.      Cervical back: Normal range of motion and neck supple.   Skin:     General: Skin is warm and dry.   Neurological:      General: No focal deficit present.      Mental Status: He is alert and oriented to person, place, and time.   Psychiatric:         Mood and Affect: Mood normal.         Behavior: Behavior normal.          Last Recorded Vitals  There were no vitals taken for this visit.     Assessment/Plan   Problem List Items Addressed This Visit       Encounter for screening for malignant neoplasm of colon    Relevant Orders    Referral to Gastroenterology     Other Visit Diagnoses       Colon cancer screening        Relevant Orders    Colonoscopy Screening; Average Risk Patient               PTA/Current Medications:  (Not in a hospital admission)    Current Outpatient Medications   Medication Sig Dispense Refill    amLODIPine (Norvasc) 2.5 mg tablet Take 1 tablet (2.5 mg) by mouth once daily. 90 tablet 1    baclofen (Lioresal) 10 mg tablet Take 1 tablet (10 mg) by mouth 3 times a day. For spasms 60 tablet 1    lisinopril 40 mg tablet Take 1 tablet (40 mg) by mouth once daily. 90 tablet 1    lovastatin (Mevacor) 20 mg tablet Take 1 tablet (20 mg) by mouth once daily at bedtime. 90 tablet 1    tadalafil (Cialis) 5 mg tablet Take 1 tablet (5 mg) by mouth once daily.       No current facility-administered medications for this encounter.     Nicholas Albert DO

## 2024-07-24 NOTE — DISCHARGE INSTRUCTIONS
Patient Instructions after a Colonoscopy      The anesthetics, sedatives or narcotics which were given to you today will be acting in your body for the next 24 hours, so you might feel a little sleepy or groggy.  This feeling should slowly wear off. Carefully read and follow the instructions.     You received sedation today:  - Do not drive or operate any machinery or power tools of any kind.   - No alcoholic beverages today, not even beer or wine.  - Do not make any important decisions or sign any legal documents.  - No over the counter medications that contain alcohol or that may cause drowsiness.  - Do not make any important decisions or sign any legal documents.  - Make sure you have someone with you for first 24 hours.    While it is common to experience mild to moderate abdominal distention, gas, or belching after your procedure, if any of these symptoms occur following discharge from the GI Lab or within one week of having your procedure, call the [facility] to be advised whether a visit to your nearest Urgent Care or Emergency Department is indicated.  Take this paper with you if you go.     - If you develop an allergic reaction to the medications that were given during your procedure such as difficulty breathing, rash, hives, severe nausea, vomiting or lightheadedness.  - If you experience chest pain, shortness of breath, severe abdominal pain, fevers and chills.  -If you develop signs and symptoms of bleeding such as blood in your spit, if your stools turn black, tarry, or bloody  - If you have not urinated within 8 hours following your procedure.  - If your IV site becomes painful, red, inflamed, or looks infected.    Your physician recommends the additional following instructions:    -You have a contact number available for emergencies. The signs and symptoms of potential delayed complications were discussed with you. You may return to normal activities tomorrow.  -Resume your previous  diet.  -Continue your present medications.   -We are waiting for your pathology results.  -Your physician has recommended a repeat colonoscopy (date to be determined after pending pathology results are reviewed) for surveillance based on pathology results.  -The findings and recommendations have been discussed with you.  -The findings and recommendations were discussed with your family.  - Please see Medication Reconciliation Form for new medication/medications prescribed.       If you experience any problems or have any questions following discharge from the GI Lab, please call:        Nurse Signature                                                                        Date___________________                                                                            Patient/Responsible Party Signature                                        Date___________________

## 2024-08-01 ENCOUNTER — OFFICE VISIT (OUTPATIENT)
Dept: PAIN MEDICINE | Facility: CLINIC | Age: 69
End: 2024-08-01
Payer: MEDICARE

## 2024-08-01 VITALS
WEIGHT: 202 LBS | SYSTOLIC BLOOD PRESSURE: 166 MMHG | DIASTOLIC BLOOD PRESSURE: 89 MMHG | HEART RATE: 62 BPM | BODY MASS INDEX: 28.98 KG/M2

## 2024-08-01 DIAGNOSIS — M54.16 LUMBAR RADICULOPATHY: ICD-10-CM

## 2024-08-01 DIAGNOSIS — M48.062 NEUROGENIC CLAUDICATION DUE TO LUMBAR SPINAL STENOSIS: ICD-10-CM

## 2024-08-01 DIAGNOSIS — M48.061 SPINAL STENOSIS AT L4-L5 LEVEL: Primary | ICD-10-CM

## 2024-08-01 PROCEDURE — 99214 OFFICE O/P EST MOD 30 MIN: CPT | Performed by: PHYSICIAN ASSISTANT

## 2024-08-01 RX ORDER — PREGABALIN 50 MG/1
50 CAPSULE ORAL 2 TIMES DAILY
Qty: 60 CAPSULE | Refills: 1 | Status: SHIPPED | OUTPATIENT
Start: 2024-08-01

## 2024-08-01 ASSESSMENT — ENCOUNTER SYMPTOMS
HEMATOLOGIC/LYMPHATIC NEGATIVE: 1
CARDIOVASCULAR NEGATIVE: 1
RESPIRATORY NEGATIVE: 1
NUMBNESS: 1
ARTHRALGIAS: 1
CONSTITUTIONAL NEGATIVE: 1
ALLERGIC/IMMUNOLOGIC NEGATIVE: 1
EYES NEGATIVE: 1
MYALGIAS: 1
BACK PAIN: 1
WEAKNESS: 1
PSYCHIATRIC NEGATIVE: 1
GASTROINTESTINAL NEGATIVE: 1
ENDOCRINE NEGATIVE: 1

## 2024-08-01 NOTE — PROGRESS NOTES
Subjective   Patient ID: Silver Edouard is a 69 y.o. male who presents for Back Pain (ONGOING LOWER BACK PAIN WORSE SINCE HE FELL ON BLACK ICE IN JANUARY, HE'S HAD X-RAYS AND MRI DONE, HE HAS JUST BEEN STAYING ACTIVE AND EVEN GOLFING, WITHIN THE PAST COUPLE OF WEEKS HIS DISCOMFORT HAS GOTTEN BETTER, HE STATES THAT HE GET A NUMB FEELING INTO HIS LEGS THE LONGER HE IS UP AND STANDING, HE HAS AN ACHING PAIN TO THE LOWER BACK, ). PAIN WITH TRANSITIONING, STANDING, WALKING, BENDING,LAYING DOWN AT NIGHT, HE HAS BEEN TAKING TYLENOL AND IBUPROFEN FOR RELIEF, TAKING BACLOFEN AT NIGHT, PAIN SCORE 2/10, DEP NO, FALLS YES ED GIVEN, SMOKING NO, SOAPP=3, CATHRYN=16%    Layla Bell, [facility] 08/01/24 8:18 AM     Patient is a 69-year-old male.  He presents today with complaints of lower back pain.  He has a long history of this and he states that before he had injections it was on Nabumetone.  The first injection did not help.  A series of 3 that did help.  The Nabumetone was helpful.  This was years ago and he states that he intermittently has flareups of sciatica but usually it is been well-controlled.  In January he fell.  He fell on black ice.  He had hip pain at that time again and he states that since then to now it has gotten somewhat better but it still intermittently is very bothersome to him.  He rates the discomfort a 2/10 at this time.  He states that just recently it got better again.  He is able to golf.  He is able to do things he wants to do but he wanted to keep the appointment to discuss his options.  He wonders if maybe he needs to be on some sort of medication.  He states that his wife is on Lyrica with improvement.  Patient does not feel that the pain is significant enough to pursue an injection at this time but did want to discuss his options with us.        Review of Systems   Constitutional: Negative.    HENT: Negative.     Eyes: Negative.    Respiratory: Negative.     Cardiovascular: Negative.     Gastrointestinal: Negative.    Endocrine: Negative.    Genitourinary: Negative.    Musculoskeletal:  Positive for arthralgias, back pain, gait problem and myalgias.   Skin: Negative.    Allergic/Immunologic: Negative.    Neurological:  Positive for weakness and numbness.   Hematological: Negative.    Psychiatric/Behavioral: Negative.         Objective   Physical Exam  Vitals and nursing note reviewed.   Constitutional:       General: He is not in acute distress.     Appearance: Normal appearance. He is not ill-appearing.   HENT:      Head: Normocephalic and atraumatic.      Right Ear: External ear normal.      Left Ear: External ear normal.      Nose: Nose normal.      Mouth/Throat:      Pharynx: Oropharynx is clear.   Eyes:      Conjunctiva/sclera: Conjunctivae normal.   Cardiovascular:      Rate and Rhythm: Normal rate and regular rhythm.      Pulses: Normal pulses.   Pulmonary:      Effort: Pulmonary effort is normal.      Breath sounds: Normal breath sounds.   Musculoskeletal:         General: Normal range of motion.      Cervical back: Normal range of motion.      Comments: 5/5 lower extremity strength   Skin:     General: Skin is warm and dry.   Neurological:      General: No focal deficit present.      Mental Status: He is alert and oriented to person, place, and time. Mental status is at baseline.   Psychiatric:         Mood and Affect: Mood normal.         Behavior: Behavior normal.         Thought Content: Thought content normal.         Judgment: Judgment normal.       MR lumbar spine w and wo IV contrast  Status: Final result     PACS Images     Show images for MR lumbar spine w and wo IV contrast  Signed by    Signed Time Phone Pager   Maggie Patel MD 6/27/2024 00:20 [phone]      Exam Information    Status Exam Begun Exam Ended   Final 6/26/2024 14:10 6/26/2024 15:26     Study Result    Narrative & Impression   Interpreted By:  Maggie Patel,   STUDY:  MR LUMBAR SPINE W AND WO IV CONTRAST       INDICATION:  Signs/Symptoms:Chronic low back pain with intermittent weakness in  lower extremities and weakness on flexion today      COMPARISON:  Lumbosacral spine radiograph 08/16/2022      ACCESSION NUMBER(S):  [id number]      ORDERING CLINICIAN:  DAYAMI URIBE      TECHNIQUE:  Multiplanar multisequence MRI of the lumbar spine was performed  before and after the intravenous administration of contrast according  to standard protocol. 20 ml of Dotarem was administered (the balance  of single use vial(s) has/have been discarded).      FINDINGS:  ALIGNMENT: 5 mm grade 1 anterolisthesis of L4 on L5.      VERTEBRAE: The vertebral bodies are normal in height. There is no  fracture or aggressive osseous lesion.      DISCS: The disc spaces are maintained.      CONUS MEDULLARIS AND CAUDA EQUINA: The conus medullaris terminates at  L1-2. Mass-effect on the cauda equina nerve roots at L4-5 secondary  to degenerative change detailed further below. Remainder of the cauda  equina nerve roots appear normal.      ENHANCEMENT: There is no evidence of abnormal enhancement.      PARAVERTEBRAL SOFT TISSUES AND VISUALIZED RETROPERITONEUM: The  visualized paravertebral soft tissues appear within normal limits.      EVALUATION OF INDIVIDUAL LEVELS:  L1-2: No disc herniation  spinal canal or neuroforaminal stenosis.      L2-3: Disc bulge asymmetric to the left results in mild narrowing of  the left neural foramen. Spinal canal and right neural foramina are  patent.      L3-4: Shallow disc bulge with facet hypertrophy results in mild  narrowing of bilateral foramina. Spinal canal remains patent.      L4-5: 5 mm grade 1 anterolisthesis with superimposed disc bulge,  facet hypertrophy, and infolding of ligamentum flavum severe canal  stenosis and mass-effect on the cauda equina nerve roots as well as  effacement of bilateral subarticular recesses. There is severe right  foraminal stenosis with contact of the exiting right L4 nerve  root;  mild-to-moderate left foraminal stenosis.      L5-S1: Bilateral facet hypertrophy mildly narrows bilateral foramina.  Spinal canal remains patent.      LIMITED EVALUATION OF UPPER SACRUM AND SACROILIAC JOINTS: Mild  degenerative changes of the sacroiliac joints.      IMPRESSION:  Multilevel degenerative changes of the lumbar spine most prominent  L4-5 where there is grade 1 anterolisthesis and superimposed  degenerative change resulting in severe canal stenosis and  mass-effect on the cauda equina nerve roots.      No abnormal enhancement.      Signed by: Maggie Patel 6/27/2024 12:20 AM  Dictation workstation:   [id number]     Result History    MR lumbar spine w and wo IV contrast (Order #[id number]) on 6/27/2024 - Order Result History Report    Assessment/Plan   Diagnoses and all orders for this visit:  Spinal stenosis at L4-L5 level  -     pregabalin (Lyrica) 50 mg capsule; Take 1 capsule (50 mg) by mouth 2 times a day.  Neurogenic claudication due to lumbar spinal stenosis  -     pregabalin (Lyrica) 50 mg capsule; Take 1 capsule (50 mg) by mouth 2 times a day.  Lumbar radiculopathy  -     pregabalin (Lyrica) 50 mg capsule; Take 1 capsule (50 mg) by mouth 2 times a day.       Patient is 69-year-old male with a past medical history seen for lumbar stenosis and lumbar neuritis.  We reviewed his MRI.  We discussed different options.  At this time,  Patient states that his pain has gotten better.  He states that he still wanted to keep the appointment to discuss options as he feels like maybe to be on some sort of medication to try to keep the symptoms at bay but he does not want to have any injections at this time.  We discussed the possibility of future L4-5 epidural steroid injection but he feels like it is overall getting better.  We discussed trying Lyrica.  50 mg twice daily.  OARRS reviewed.  Prescription sent.  How to start the medication was discussed.  He is going to start with once a day for a few  days and if tolerating go to twice a day.  He will call if he decides he wants to pursue the injection.  Patient denies any loss of bowel or bladder control.  No saddle anesthesia.  He denies any red flag symptoms.  Just the pain which she states is getting better.  He will follow-up in 1 month.  He will call the clinic in the interim should he require anything from our services.

## 2024-08-23 DIAGNOSIS — M54.6 ACUTE MIDLINE THORACIC BACK PAIN: ICD-10-CM

## 2024-08-26 RX ORDER — BACLOFEN 10 MG/1
10 TABLET ORAL 3 TIMES DAILY
Qty: 60 TABLET | Refills: 1 | Status: SHIPPED | OUTPATIENT
Start: 2024-08-26 | End: 2025-02-22

## 2024-08-29 ENCOUNTER — OFFICE VISIT (OUTPATIENT)
Dept: PAIN MEDICINE | Facility: CLINIC | Age: 69
End: 2024-08-29
Payer: MEDICARE

## 2024-08-29 VITALS — HEART RATE: 50 BPM | SYSTOLIC BLOOD PRESSURE: 138 MMHG | DIASTOLIC BLOOD PRESSURE: 84 MMHG

## 2024-08-29 DIAGNOSIS — M48.062 NEUROGENIC CLAUDICATION DUE TO LUMBAR SPINAL STENOSIS: ICD-10-CM

## 2024-08-29 DIAGNOSIS — M54.16 LUMBAR RADICULOPATHY: ICD-10-CM

## 2024-08-29 DIAGNOSIS — M48.061 SPINAL STENOSIS AT L4-L5 LEVEL: ICD-10-CM

## 2024-08-29 PROCEDURE — 99213 OFFICE O/P EST LOW 20 MIN: CPT | Performed by: PHYSICIAN ASSISTANT

## 2024-08-29 RX ORDER — PREGABALIN 50 MG/1
50 CAPSULE ORAL 2 TIMES DAILY
Qty: 180 CAPSULE | Refills: 1 | Status: SHIPPED | OUTPATIENT
Start: 2024-08-29

## 2024-08-29 ASSESSMENT — COLUMBIA-SUICIDE SEVERITY RATING SCALE - C-SSRS
6. HAVE YOU EVER DONE ANYTHING, STARTED TO DO ANYTHING, OR PREPARED TO DO ANYTHING TO END YOUR LIFE?: NO
1. IN THE PAST MONTH, HAVE YOU WISHED YOU WERE DEAD OR WISHED YOU COULD GO TO SLEEP AND NOT WAKE UP?: NO
2. HAVE YOU ACTUALLY HAD ANY THOUGHTS OF KILLING YOURSELF?: NO

## 2024-08-29 ASSESSMENT — ENCOUNTER SYMPTOMS
ALLERGIC/IMMUNOLOGIC NEGATIVE: 1
MYALGIAS: 1
GASTROINTESTINAL NEGATIVE: 1
CONSTITUTIONAL NEGATIVE: 1
WEAKNESS: 1
ARTHRALGIAS: 1
ENDOCRINE NEGATIVE: 1
CARDIOVASCULAR NEGATIVE: 1
PSYCHIATRIC NEGATIVE: 1
EYES NEGATIVE: 1
NUMBNESS: 1
RESPIRATORY NEGATIVE: 1
HEMATOLOGIC/LYMPHATIC NEGATIVE: 1

## 2024-08-29 NOTE — PROGRESS NOTES
Subjective   Patient ID: Silver Edouard is a 69 y.o. male who presents for Follow-up (FOLLOW UP ON LYRICA, HE IS TOLERATING THE MEDICATION AND STATES HE IS GETTING RELIEF FROM HIS PAIN IN THE LOWER BACK AND THE NUMB FEELING IN HIS LEGS HAS GONE AWAY, ).HE CONTINUES DAILY EXERCISES AND STRETCHING, HE TAKES HIS BACLOFEN AND TYLENOL OR IBUPROFEN PRN, PAIN SCORE 0/10, ETOH NO, CATHRYN=1%    Layla Bell, [facility] 08/29/24 8:06 AM    Patient is a 69-year-old male.  He presents today for follow-up after starting Lyrica.  50 mg twice daily.  This has helped him.  He is doing well.  He is feeling well.  He is comfortable.  He is happy.  He gets baclofen from his PCP.  In January he fell.  He fell on black ice.  He had hip pain at that time again.  He has gotten better from this.  He has undergone injections in the past with improvement but he wanted to try and avoid this.  With starting the Lyrica, he feels like things have gone well.  He is pleased.  Does not have any pain today.  He does not have any complaints but he does not have any concerns.        Review of Systems   Constitutional: Negative.    HENT: Negative.     Eyes: Negative.    Respiratory: Negative.     Cardiovascular: Negative.    Gastrointestinal: Negative.    Endocrine: Negative.    Genitourinary: Negative.    Musculoskeletal:  Positive for arthralgias and myalgias.   Skin: Negative.    Allergic/Immunologic: Negative.    Neurological:  Positive for weakness and numbness.   Hematological: Negative.    Psychiatric/Behavioral: Negative.         Objective   Physical Exam  Constitutional:       General: He is not in acute distress.     Appearance: Normal appearance. He is not ill-appearing.   HENT:      Head: Normocephalic.      Right Ear: External ear normal.      Left Ear: External ear normal.      Nose: Nose normal.      Mouth/Throat:      Pharynx: Oropharynx is clear.   Eyes:      Pupils: Pupils are equal, round, and reactive to light.   Cardiovascular:       Rate and Rhythm: Normal rate.   Pulmonary:      Effort: Pulmonary effort is normal.   Musculoskeletal:         General: Normal range of motion.      Right shoulder: Normal.      Left shoulder: Normal.      Right elbow: Normal.      Left elbow: Normal.      Right wrist: Normal.      Left wrist: Normal.      Cervical back: Normal, normal range of motion and neck supple.      Thoracic back: Normal.      Lumbar back: Normal.      Right hip: Normal.      Left hip: Normal.      Right knee: Normal.      Left knee: Normal.      Comments: 5/5 strength   Skin:     General: Skin is warm and dry.   Neurological:      General: No focal deficit present.      Mental Status: He is alert and oriented to person, place, and time.   Psychiatric:         Mood and Affect: Mood normal.         Behavior: Behavior normal.         Thought Content: Thought content normal.         Judgment: Judgment normal.         Assessment/Plan   Diagnoses and all orders for this visit:  Spinal stenosis at L4-L5 level  -     pregabalin (Lyrica) 50 mg capsule; Take 1 capsule (50 mg) by mouth 2 times a day.  Neurogenic claudication due to lumbar spinal stenosis  -     pregabalin (Lyrica) 50 mg capsule; Take 1 capsule (50 mg) by mouth 2 times a day.  Lumbar radiculopathy  -     pregabalin (Lyrica) 50 mg capsule; Take 1 capsule (50 mg) by mouth 2 times a day.       Patient is 69-year-old male with the above-mentioned medical diagnoses following up today for a medication management follow-up.  He is using Lyrica.  50 mg twice daily.  He is doing well.  He is feeling well.  He is controlled.  He is happy.  He feels that the medication has given him the relief he is looking for.  At this time, he does not have any complaints or concern.  He does not have any pain.  He is just here today to continue on the medication.  OARRS was reviewed.  A 3-month supply of the medication was sent to the pharmacy.  He will follow-up in 3 months.  He will call us in the interim  should he require anything from our services.

## 2024-09-10 DIAGNOSIS — N40.0 BENIGN PROSTATIC HYPERPLASIA WITHOUT LOWER URINARY TRACT SYMPTOMS: Primary | ICD-10-CM

## 2024-10-04 ENCOUNTER — HOSPITAL ENCOUNTER (EMERGENCY)
Facility: HOSPITAL | Age: 69
Discharge: HOME | End: 2024-10-04
Attending: EMERGENCY MEDICINE
Payer: MEDICARE

## 2024-10-04 VITALS
WEIGHT: 195 LBS | OXYGEN SATURATION: 97 % | RESPIRATION RATE: 16 BRPM | SYSTOLIC BLOOD PRESSURE: 164 MMHG | HEIGHT: 70 IN | TEMPERATURE: 97.2 F | HEART RATE: 65 BPM | DIASTOLIC BLOOD PRESSURE: 83 MMHG | BODY MASS INDEX: 27.92 KG/M2

## 2024-10-04 DIAGNOSIS — R51.9 LEFT FACIAL PAIN: Primary | ICD-10-CM

## 2024-10-04 DIAGNOSIS — K08.89 ODONTALGIA: ICD-10-CM

## 2024-10-04 PROCEDURE — 99283 EMERGENCY DEPT VISIT LOW MDM: CPT

## 2024-10-04 RX ORDER — OXYCODONE AND ACETAMINOPHEN 5; 325 MG/1; MG/1
1 TABLET ORAL 4 TIMES DAILY
Qty: 12 TABLET | Refills: 0 | Status: SHIPPED | OUTPATIENT
Start: 2024-10-04 | End: 2024-10-07

## 2024-10-04 RX ORDER — AMOXICILLIN AND CLAVULANATE POTASSIUM 875; 125 MG/1; MG/1
875 TABLET, FILM COATED ORAL 2 TIMES DAILY
Qty: 20 TABLET | Refills: 0 | Status: SHIPPED | OUTPATIENT
Start: 2024-10-04 | End: 2024-10-14

## 2024-10-04 ASSESSMENT — ENCOUNTER SYMPTOMS
PALPITATIONS: 0
WHEEZING: 0
PSYCHIATRIC NEGATIVE: 1
ABDOMINAL PAIN: 0
FLANK PAIN: 0
DIZZINESS: 0
VOMITING: 0
PHOTOPHOBIA: 0
COUGH: 0
CHILLS: 0
MYALGIAS: 1
TROUBLE SWALLOWING: 0
FEVER: 0
SORE THROAT: 0
NECK STIFFNESS: 0
HEMATOLOGIC/LYMPHATIC NEGATIVE: 1
NAUSEA: 0
NECK PAIN: 0
HEADACHES: 0
BACK PAIN: 0

## 2024-10-04 ASSESSMENT — COLUMBIA-SUICIDE SEVERITY RATING SCALE - C-SSRS
6. HAVE YOU EVER DONE ANYTHING, STARTED TO DO ANYTHING, OR PREPARED TO DO ANYTHING TO END YOUR LIFE?: NO
2. HAVE YOU ACTUALLY HAD ANY THOUGHTS OF KILLING YOURSELF?: NO
1. IN THE PAST MONTH, HAVE YOU WISHED YOU WERE DEAD OR WISHED YOU COULD GO TO SLEEP AND NOT WAKE UP?: NO

## 2024-10-04 ASSESSMENT — PAIN SCALES - GENERAL: PAINLEVEL_OUTOF10: 3

## 2024-10-04 ASSESSMENT — PAIN - FUNCTIONAL ASSESSMENT: PAIN_FUNCTIONAL_ASSESSMENT: 0-10

## 2024-10-04 NOTE — ED PROVIDER NOTES
Chief Complaint: FACIAL/DENTAL PAIN    This is a 69-year-old male who presents from urgent care for left facial dental pain.  He has similar occurrence in August and was placed on Augmentin and Percocet with complete resolution.  He has never seen a dentist as of yet he states he has some bad teeth.  He denies any earache no sore throat no difficulty swallowing no neck swelling or pain that discomfort is in the left upper maxilla he denies any orbital swelling denies any headache no fever or chills           Review of Systems   Constitutional:  Negative for chills and fever.   HENT:  Positive for dental problem. Negative for congestion, ear pain, sore throat and trouble swallowing.    Eyes:  Negative for photophobia and visual disturbance.   Respiratory:  Negative for cough and wheezing.    Cardiovascular:  Negative for chest pain and palpitations.   Gastrointestinal:  Negative for abdominal pain, nausea and vomiting.   Genitourinary:  Negative for flank pain.   Musculoskeletal:  Positive for myalgias. Negative for back pain, neck pain and neck stiffness.   Skin:  Negative for rash.   Neurological:  Negative for dizziness and headaches.   Hematological: Negative.    Psychiatric/Behavioral: Negative.     All other systems reviewed and are negative.       Physical Exam  Constitutional:       General: He is not in acute distress.     Appearance: Normal appearance. He is not ill-appearing or toxic-appearing.   HENT:      Head: Normocephalic and atraumatic.      Right Ear: Tympanic membrane normal.      Left Ear: Tympanic membrane normal.      Nose: Nose normal.      Mouth/Throat:      Lips: No lesions.      Mouth: Mucous membranes are moist. No injury, oral lesions or angioedema.      Dentition: Dental tenderness and dental caries present.      Pharynx: Oropharynx is clear.      Tonsils: No tonsillar exudate or tonsillar abscesses.     Eyes:      Extraocular Movements: Extraocular movements intact.       Conjunctiva/sclera: Conjunctivae normal.      Pupils: Pupils are equal, round, and reactive to light.   Musculoskeletal:      Cervical back: Normal range of motion and neck supple. No rigidity or tenderness.   Skin:     General: Skin is warm and dry.      Capillary Refill: Capillary refill takes less than 2 seconds.      Findings: No rash.   Neurological:      General: No focal deficit present.      Mental Status: He is alert and oriented to person, place, and time.      Sensory: No sensory deficit.      Motor: No weakness.   Psychiatric:         Mood and Affect: Mood normal.         Behavior: Behavior normal.          Labs Reviewed - No data to display     No orders to display        Procedures     Medical Decision Making  [place] diagnose include sinusitis dental abscess lymphadenitis parotitis.  Examination seems pain is limited to the upper maxilla on the left side there is no obvious signs of infection will be treated for presumed dental infection with Augmentin with Percocet for pain and as needed Motrin and is advised to follow-up with his dentist next week         Diagnoses as of 10/04/24 1540   Left facial pain   Odontalgia                    Ck Matos MD  10/04/24 1541

## 2024-10-16 DIAGNOSIS — M54.6 ACUTE MIDLINE THORACIC BACK PAIN: ICD-10-CM

## 2024-10-16 RX ORDER — BACLOFEN 10 MG/1
10 TABLET ORAL 3 TIMES DAILY
Qty: 60 TABLET | Refills: 1 | Status: SHIPPED | OUTPATIENT
Start: 2024-10-16 | End: 2025-04-14

## 2024-11-21 ENCOUNTER — OFFICE VISIT (OUTPATIENT)
Dept: PAIN MEDICINE | Facility: CLINIC | Age: 69
End: 2024-11-21
Payer: MEDICARE

## 2024-11-21 VITALS
SYSTOLIC BLOOD PRESSURE: 159 MMHG | RESPIRATION RATE: 16 BRPM | HEART RATE: 56 BPM | BODY MASS INDEX: 29.56 KG/M2 | WEIGHT: 206 LBS | DIASTOLIC BLOOD PRESSURE: 79 MMHG

## 2024-11-21 DIAGNOSIS — M54.16 LUMBAR RADICULOPATHY: ICD-10-CM

## 2024-11-21 DIAGNOSIS — G89.29 CHRONIC BILATERAL LOW BACK PAIN WITH BILATERAL SCIATICA: Primary | ICD-10-CM

## 2024-11-21 DIAGNOSIS — M54.41 CHRONIC BILATERAL LOW BACK PAIN WITH BILATERAL SCIATICA: Primary | ICD-10-CM

## 2024-11-21 DIAGNOSIS — M48.062 NEUROGENIC CLAUDICATION DUE TO LUMBAR SPINAL STENOSIS: ICD-10-CM

## 2024-11-21 DIAGNOSIS — M54.42 CHRONIC BILATERAL LOW BACK PAIN WITH BILATERAL SCIATICA: Primary | ICD-10-CM

## 2024-11-21 DIAGNOSIS — M48.061 SPINAL STENOSIS AT L4-L5 LEVEL: ICD-10-CM

## 2024-11-21 PROCEDURE — 99213 OFFICE O/P EST LOW 20 MIN: CPT | Performed by: PHYSICIAN ASSISTANT

## 2024-11-21 ASSESSMENT — ENCOUNTER SYMPTOMS
ENDOCRINE NEGATIVE: 1
NUMBNESS: 1
GASTROINTESTINAL NEGATIVE: 1
MYALGIAS: 1
WEAKNESS: 1
ARTHRALGIAS: 1
ALLERGIC/IMMUNOLOGIC NEGATIVE: 1
RESPIRATORY NEGATIVE: 1
BACK PAIN: 1
EYES NEGATIVE: 1
CARDIOVASCULAR NEGATIVE: 1
CONSTITUTIONAL NEGATIVE: 1
PSYCHIATRIC NEGATIVE: 1
HEMATOLOGIC/LYMPHATIC NEGATIVE: 1

## 2024-11-21 NOTE — PROGRESS NOTES
Subjective   Patient ID: Silver Edouard is a 69 y.o. male who presents for Med Refill (FUV 3months for meds. Today he is having pain in bilat hips in to bilat back of his legs, rates is pain 2/10 now and 4-5/10 he is pleases with the relief. He is taking Lyrica, Tylenol, Baclofen, daily stretching and exercise these all help his pain. )   CATHRYN score 10%    Kori Mckee RN 11/21/24 8:16 AM      Patient is a 69-year-old male.  He presents today for a 3-month medication management follow-up.  He is using Lyrica.  50 mg twice daily.  This has helped him.  He is feeling well.  He is doing well.  He is comfortable that he is happy.  He gets baclofen from his PCP and he feels that these overall worked very well for him.  He has history of injections in the past with relief but he does not want to do this again.  Upon offering him the injection he states that he is not to that point yet and he does not want to do this.  He feels that the medications overall keep his pain very well under control.  He has some minimal pain that he states is overall very manageable.          Review of Systems   Constitutional: Negative.    HENT: Negative.     Eyes: Negative.    Respiratory: Negative.     Cardiovascular: Negative.    Gastrointestinal: Negative.    Endocrine: Negative.    Genitourinary: Negative.    Musculoskeletal:  Positive for arthralgias, back pain and myalgias.   Skin: Negative.    Allergic/Immunologic: Negative.    Neurological:  Positive for weakness and numbness.   Hematological: Negative.    Psychiatric/Behavioral: Negative.         Objective   Physical Exam  Vitals and nursing note reviewed.   Constitutional:       General: He is not in acute distress.     Appearance: Normal appearance. He is not ill-appearing.   HENT:      Head: Normocephalic and atraumatic.      Right Ear: External ear normal.      Left Ear: External ear normal.      Nose: Nose normal.      Mouth/Throat:      Pharynx: Oropharynx is clear.   Eyes:       Conjunctiva/sclera: Conjunctivae normal.   Cardiovascular:      Rate and Rhythm: Normal rate and regular rhythm.      Pulses: Normal pulses.   Pulmonary:      Effort: Pulmonary effort is normal.      Breath sounds: Normal breath sounds.   Musculoskeletal:         General: Normal range of motion.      Cervical back: Normal range of motion.      Comments: 5/5 strength   Skin:     General: Skin is warm and dry.   Neurological:      General: No focal deficit present.      Mental Status: He is alert and oriented to person, place, and time. Mental status is at baseline.   Psychiatric:         Mood and Affect: Mood normal.         Behavior: Behavior normal.         Thought Content: Thought content normal.         Judgment: Judgment normal.         Assessment/Plan   Diagnoses and all orders for this visit:  Chronic bilateral low back pain with bilateral sciatica  Neurogenic claudication due to lumbar spinal stenosis  Lumbar radiculopathy  Spinal stenosis at L4-L5 level       Patient is a 69-year-old male with the above-mentioned medical diagnoses following up today for his Lyrica refill.  He tolerates this well.  No side effects.  He takes it as prescribed.  He feels that this overall helps him.  [place]AR[place] was reviewed.  He does not require refill of Lyrica.  He will call when he does.  He is going to follow-up in 3 months for medication management.  He will call us in the interim should he require anything from our services.

## 2024-11-29 ENCOUNTER — LAB (OUTPATIENT)
Dept: LAB | Facility: LAB | Age: 69
End: 2024-11-29
Payer: MEDICARE

## 2024-11-29 DIAGNOSIS — N40.0 BENIGN PROSTATIC HYPERPLASIA WITHOUT LOWER URINARY TRACT SYMPTOMS: ICD-10-CM

## 2024-11-29 LAB — PSA SERPL-MCNC: 11.77 NG/ML

## 2024-11-29 PROCEDURE — 84153 ASSAY OF PSA TOTAL: CPT

## 2024-11-29 PROCEDURE — 36415 COLL VENOUS BLD VENIPUNCTURE: CPT

## 2025-02-01 DIAGNOSIS — I15.9 SECONDARY HYPERTENSION: ICD-10-CM

## 2025-02-02 DIAGNOSIS — I15.9 SECONDARY HYPERTENSION: ICD-10-CM

## 2025-02-02 DIAGNOSIS — E78.2 MIXED HYPERLIPIDEMIA: ICD-10-CM

## 2025-02-20 DIAGNOSIS — I15.9 SECONDARY HYPERTENSION: ICD-10-CM

## 2025-02-20 RX ORDER — AMLODIPINE BESYLATE 2.5 MG/1
2.5 TABLET ORAL DAILY
Qty: 90 TABLET | Refills: 1 | OUTPATIENT
Start: 2025-02-20

## 2025-02-20 RX ORDER — AMLODIPINE BESYLATE 2.5 MG/1
2.5 TABLET ORAL DAILY
Qty: 90 TABLET | Refills: 0 | Status: SHIPPED | OUTPATIENT
Start: 2025-02-20

## 2025-02-20 RX ORDER — LISINOPRIL 40 MG/1
40 TABLET ORAL DAILY
Qty: 90 TABLET | Refills: 0 | Status: SHIPPED | OUTPATIENT
Start: 2025-02-20

## 2025-02-20 RX ORDER — LISINOPRIL 40 MG/1
40 TABLET ORAL DAILY
Qty: 90 TABLET | Refills: 1 | OUTPATIENT
Start: 2025-02-20

## 2025-02-27 ENCOUNTER — APPOINTMENT (OUTPATIENT)
Dept: PAIN MEDICINE | Facility: CLINIC | Age: 70
End: 2025-02-27
Payer: MEDICARE

## 2025-03-03 DIAGNOSIS — E78.2 MIXED HYPERLIPIDEMIA: ICD-10-CM

## 2025-03-03 RX ORDER — LOVASTATIN 20 MG/1
20 TABLET ORAL NIGHTLY
Qty: 100 TABLET | Refills: 1 | Status: SHIPPED | OUTPATIENT
Start: 2025-03-03

## 2025-03-03 RX ORDER — LOVASTATIN 20 MG/1
TABLET ORAL
Qty: 90 TABLET | Refills: 1 | OUTPATIENT
Start: 2025-03-03

## 2025-04-13 DIAGNOSIS — I15.9 SECONDARY HYPERTENSION: ICD-10-CM

## 2025-05-13 ENCOUNTER — HOSPITAL ENCOUNTER (OUTPATIENT)
Dept: RADIOLOGY | Facility: HOSPITAL | Age: 70
Discharge: HOME | End: 2025-05-13
Payer: MEDICARE

## 2025-05-13 DIAGNOSIS — R97.20 ELEVATED PROSTATE SPECIFIC ANTIGEN (PSA): ICD-10-CM

## 2025-05-13 PROCEDURE — 72197 MRI PELVIS W/O & W/DYE: CPT

## 2025-05-13 PROCEDURE — A9575 INJ GADOTERATE MEGLUMI 0.1ML: HCPCS

## 2025-05-13 PROCEDURE — 2550000001 HC RX 255 CONTRASTS

## 2025-05-13 RX ORDER — GADOTERATE MEGLUMINE 376.9 MG/ML
18 INJECTION INTRAVENOUS
Status: COMPLETED | OUTPATIENT
Start: 2025-05-13 | End: 2025-05-13

## 2025-05-13 RX ADMIN — GADOTERATE MEGLUMINE 18 ML: 376.9 INJECTION INTRAVENOUS at 10:44

## 2025-05-14 DIAGNOSIS — I15.9 SECONDARY HYPERTENSION: ICD-10-CM

## 2025-05-14 RX ORDER — LISINOPRIL 40 MG/1
40 TABLET ORAL DAILY
Qty: 90 TABLET | Refills: 0 | OUTPATIENT
Start: 2025-05-14

## 2025-05-14 RX ORDER — AMLODIPINE BESYLATE 2.5 MG/1
2.5 TABLET ORAL DAILY
Qty: 90 TABLET | Refills: 0 | OUTPATIENT
Start: 2025-05-14

## 2025-05-14 NOTE — TELEPHONE ENCOUNTER
PATIENT SCHEDULED YEARLY APPOINTMENT. WHAT LAB WORK WOULD YOU LIKE PRIOR?   Tumor Debulked?: dermablade

## 2025-05-15 DIAGNOSIS — I15.9 SECONDARY HYPERTENSION: ICD-10-CM

## 2025-05-15 DIAGNOSIS — E11.9 TYPE 2 DIABETES MELLITUS WITHOUT COMPLICATION, WITHOUT LONG-TERM CURRENT USE OF INSULIN: Primary | ICD-10-CM

## 2025-05-15 DIAGNOSIS — E78.2 MIXED HYPERLIPIDEMIA: ICD-10-CM

## 2025-05-15 RX ORDER — AMLODIPINE BESYLATE 2.5 MG/1
2.5 TABLET ORAL DAILY
Qty: 90 TABLET | Refills: 0 | Status: SHIPPED | OUTPATIENT
Start: 2025-05-15 | End: 2025-05-15 | Stop reason: SDUPTHER

## 2025-05-15 RX ORDER — LISINOPRIL 40 MG/1
40 TABLET ORAL DAILY
Qty: 90 TABLET | Refills: 0 | Status: SHIPPED | OUTPATIENT
Start: 2025-05-15

## 2025-05-15 RX ORDER — AMLODIPINE BESYLATE 2.5 MG/1
2.5 TABLET ORAL DAILY
Qty: 90 TABLET | Refills: 0 | Status: SHIPPED | OUTPATIENT
Start: 2025-05-15

## 2025-05-15 RX ORDER — LISINOPRIL 40 MG/1
40 TABLET ORAL DAILY
Qty: 90 TABLET | Refills: 0 | Status: SHIPPED | OUTPATIENT
Start: 2025-05-15 | End: 2025-05-15 | Stop reason: SDUPTHER

## 2025-07-08 DIAGNOSIS — E78.2 MIXED HYPERLIPIDEMIA: ICD-10-CM

## 2025-07-08 RX ORDER — LOVASTATIN 20 MG/1
20 TABLET ORAL NIGHTLY
Qty: 100 TABLET | Refills: 0 | Status: SHIPPED | OUTPATIENT
Start: 2025-07-08

## 2025-07-15 ENCOUNTER — APPOINTMENT (OUTPATIENT)
Age: 70
End: 2025-07-15
Payer: MEDICARE

## 2025-07-15 VITALS
HEIGHT: 70 IN | WEIGHT: 204.5 LBS | BODY MASS INDEX: 29.28 KG/M2 | OXYGEN SATURATION: 95 % | DIASTOLIC BLOOD PRESSURE: 86 MMHG | HEART RATE: 65 BPM | SYSTOLIC BLOOD PRESSURE: 132 MMHG

## 2025-07-15 DIAGNOSIS — M48.061 SPINAL STENOSIS AT L4-L5 LEVEL: ICD-10-CM

## 2025-07-15 DIAGNOSIS — M48.062 NEUROGENIC CLAUDICATION DUE TO LUMBAR SPINAL STENOSIS: ICD-10-CM

## 2025-07-15 DIAGNOSIS — M54.16 LUMBAR RADICULOPATHY: ICD-10-CM

## 2025-07-15 DIAGNOSIS — E78.2 MIXED HYPERLIPIDEMIA: ICD-10-CM

## 2025-07-15 DIAGNOSIS — E11.9 TYPE 2 DIABETES MELLITUS WITHOUT COMPLICATION, WITHOUT LONG-TERM CURRENT USE OF INSULIN: ICD-10-CM

## 2025-07-15 DIAGNOSIS — I15.9 SECONDARY HYPERTENSION: ICD-10-CM

## 2025-07-15 DIAGNOSIS — Z00.00 MEDICARE ANNUAL WELLNESS VISIT, SUBSEQUENT: Primary | ICD-10-CM

## 2025-07-15 PROBLEM — M54.6 ACUTE MIDLINE THORACIC BACK PAIN: Status: RESOLVED | Noted: 2024-01-17 | Resolved: 2025-07-15

## 2025-07-15 LAB
ALBUMIN/CREAT UR: 6 MG/G CREAT
ANION GAP SERPL CALCULATED.4IONS-SCNC: 10 MMOL/L (CALC) (ref 7–17)
BUN SERPL-MCNC: 20 MG/DL (ref 7–25)
BUN/CREAT SERPL: ABNORMAL (CALC) (ref 6–22)
CALCIUM SERPL-MCNC: 9.2 MG/DL (ref 8.6–10.3)
CHLORIDE SERPL-SCNC: 104 MMOL/L (ref 98–110)
CHOLEST SERPL-MCNC: 147 MG/DL
CHOLEST/HDLC SERPL: 3.8 (CALC)
CO2 SERPL-SCNC: 22 MMOL/L (ref 20–32)
CREAT SERPL-MCNC: 1.14 MG/DL (ref 0.7–1.28)
CREAT UR-MCNC: 36 MG/DL (ref 20–320)
EGFRCR SERPLBLD CKD-EPI 2021: 69 ML/MIN/1.73M2
EST. AVERAGE GLUCOSE BLD GHB EST-MCNC: 148 MG/DL
EST. AVERAGE GLUCOSE BLD GHB EST-SCNC: 8.2 MMOL/L
GLUCOSE SERPL-MCNC: 115 MG/DL (ref 65–99)
HBA1C MFR BLD: 6.8 %
HDLC SERPL-MCNC: 39 MG/DL
LDLC SERPL CALC-MCNC: 83 MG/DL (CALC)
MICROALBUMIN UR-MCNC: 0.2 MG/DL
NONHDLC SERPL-MCNC: 108 MG/DL (CALC)
POTASSIUM SERPL-SCNC: 5 MMOL/L (ref 3.5–5.3)
SODIUM SERPL-SCNC: 136 MMOL/L (ref 135–146)
TRIGL SERPL-MCNC: 145 MG/DL

## 2025-07-15 PROCEDURE — 1036F TOBACCO NON-USER: CPT | Performed by: INTERNAL MEDICINE

## 2025-07-15 PROCEDURE — 4010F ACE/ARB THERAPY RXD/TAKEN: CPT | Performed by: INTERNAL MEDICINE

## 2025-07-15 PROCEDURE — G2211 COMPLEX E/M VISIT ADD ON: HCPCS | Performed by: INTERNAL MEDICINE

## 2025-07-15 PROCEDURE — 1170F FXNL STATUS ASSESSED: CPT | Performed by: INTERNAL MEDICINE

## 2025-07-15 PROCEDURE — G0439 PPPS, SUBSEQ VISIT: HCPCS | Performed by: INTERNAL MEDICINE

## 2025-07-15 PROCEDURE — 3079F DIAST BP 80-89 MM HG: CPT | Performed by: INTERNAL MEDICINE

## 2025-07-15 PROCEDURE — 1159F MED LIST DOCD IN RCRD: CPT | Performed by: INTERNAL MEDICINE

## 2025-07-15 PROCEDURE — 1160F RVW MEDS BY RX/DR IN RCRD: CPT | Performed by: INTERNAL MEDICINE

## 2025-07-15 PROCEDURE — 1124F ACP DISCUSS-NO DSCNMKR DOCD: CPT | Performed by: INTERNAL MEDICINE

## 2025-07-15 PROCEDURE — 99214 OFFICE O/P EST MOD 30 MIN: CPT | Performed by: INTERNAL MEDICINE

## 2025-07-15 PROCEDURE — 3008F BODY MASS INDEX DOCD: CPT | Performed by: INTERNAL MEDICINE

## 2025-07-15 PROCEDURE — 1157F ADVNC CARE PLAN IN RCRD: CPT | Performed by: INTERNAL MEDICINE

## 2025-07-15 PROCEDURE — 3075F SYST BP GE 130 - 139MM HG: CPT | Performed by: INTERNAL MEDICINE

## 2025-07-15 RX ORDER — LISINOPRIL 40 MG/1
40 TABLET ORAL DAILY
Qty: 100 TABLET | Refills: 1 | Status: SHIPPED | OUTPATIENT
Start: 2025-07-15

## 2025-07-15 RX ORDER — AMLODIPINE BESYLATE 2.5 MG/1
2.5 TABLET ORAL DAILY
Qty: 100 TABLET | Refills: 1 | Status: SHIPPED | OUTPATIENT
Start: 2025-07-15

## 2025-07-15 RX ORDER — LOVASTATIN 20 MG/1
20 TABLET ORAL NIGHTLY
Qty: 100 TABLET | Refills: 1 | Status: SHIPPED | OUTPATIENT
Start: 2025-07-15

## 2025-07-15 ASSESSMENT — ENCOUNTER SYMPTOMS
DIARRHEA: 0
PALPITATIONS: 0
BLOOD IN STOOL: 0
FATIGUE: 0
ABDOMINAL PAIN: 0
NAUSEA: 0
BACK PAIN: 0
ARTHRALGIAS: 0
COUGH: 0
VOMITING: 0
WHEEZING: 0
SHORTNESS OF BREATH: 0

## 2025-07-15 ASSESSMENT — PATIENT HEALTH QUESTIONNAIRE - PHQ9
2. FEELING DOWN, DEPRESSED OR HOPELESS: NOT AT ALL
1. LITTLE INTEREST OR PLEASURE IN DOING THINGS: NOT AT ALL
SUM OF ALL RESPONSES TO PHQ9 QUESTIONS 1 AND 2: 0

## 2025-07-15 ASSESSMENT — ACTIVITIES OF DAILY LIVING (ADL)
MANAGING_FINANCES: INDEPENDENT
BATHING: INDEPENDENT
GROCERY_SHOPPING: INDEPENDENT
TAKING_MEDICATION: INDEPENDENT
DRESSING: INDEPENDENT
DOING_HOUSEWORK: INDEPENDENT

## 2025-07-15 NOTE — PROGRESS NOTES
Subjective   Reason for Visit: Silver Edouard is an 70 y.o. male here for a Medicare Wellness visit.     Past Medical, Surgical, and Family History reviewed and updated in chart.    Reviewed all medications by prescribing practitioner or clinical pharmacist (such as prescriptions, OTCs, herbal therapies and supplements) and documented in the medical record.    HPI  He is here today for his routine checkup.  We also took this opportunity to complete his annual Medicare wellness visit.  He currently denies any symptoms of depression.  He had 1 fall several weeks ago.  He explains that he was at the outdoor [employer] event and after he was done he was walking around to help  things.  It was dark and he inadvertently tripped causing him to fall forward and scuff his right knee.  Thankfully he did not incur any serious injuries.  We discussed fall prevention and he states he has purchased a grab bar to install in his bathroom.  We also discussed avoiding any unnecessary loose rugs.  We agree that his memory is good and so is his hearing.  We discussed the importance of routine exercise and trying to get 30 minutes of aerobic activity 5 days a week.  I also remind him to complete his advance directives including living will and power of  for healthcare.  We also conducted a full review of systems and his only big complaint today is that of chronic low back pain.  He states that when he golfs he feels better and he also uses the baclofen on occasion.  We discussed doing stretching exercises each morning before starting his day.  We are also giving him refills on all of his medications.  We also went over the results of recent lab work.  His hemoglobin A1c did creep up a little bit at 6.8.  He states that he will be making changes in his lifestyle habits and will improve diet and exercise activities.  We have decided that he can safely return in 6 months for another checkup with lab work.  We also reviewed  "his cholesterol which was very good    Patient Care Team:  Anita Faustin DO as PCP - General (Internal Medicine)  Anita Faustin DO as PCP - [facility] Attributed Provider     Review of Systems   Constitutional:  Negative for fatigue.   Respiratory:  Negative for cough, shortness of breath and wheezing.    Cardiovascular:  Negative for chest pain, palpitations and leg swelling.   Gastrointestinal:  Negative for abdominal pain, blood in stool, diarrhea, nausea and vomiting.   Musculoskeletal:  Negative for arthralgias and back pain.       Objective   Vitals:  /86   Pulse 65   Ht 1.778 m (5' 10\")   Wt 92.8 kg (204 lb 8 oz)   SpO2 95%   BMI 29.34 kg/m²       Physical Exam  Vitals and nursing note reviewed.   Constitutional:       General: He is not in acute distress.     Appearance: Normal appearance.   HENT:      Head: Normocephalic and atraumatic.   Eyes:      Conjunctiva/sclera: Conjunctivae normal.   Cardiovascular:      Rate and Rhythm: Normal rate and regular rhythm.      Heart sounds: Normal heart sounds.   Pulmonary:      Effort: No respiratory distress.      Breath sounds: No wheezing.   Abdominal:      Palpations: Abdomen is soft.      Tenderness: There is no abdominal tenderness. There is no guarding.   Musculoskeletal:         General: No swelling. Normal range of motion.   Skin:     General: Skin is warm and dry.   Neurological:      General: No focal deficit present.      Mental Status: He is alert and oriented to person, place, and time.   Psychiatric:         Behavior: Behavior normal.       Recent Results (from the past 4 weeks)   Lipid Panel    Collection Time: 07/14/25 10:23 AM   Result Value Ref Range    CHOLESTEROL, TOTAL 147 <200 mg/dL    HDL CHOLESTEROL 39 (L) > OR = 40 mg/dL    TRIGLYCERIDES 145 <150 mg/dL    LDL-CHOLESTEROL 83 mg/dL (calc)    CHOL/HDLC RATIO 3.8 <5.0 (calc)    NON HDL CHOLESTEROL 108 <130 mg/dL (calc)   Basic Metabolic Panel    Collection Time: 07/14/25 10:23 AM "   Result Value Ref Range    GLUCOSE 115 (H) 65 - 99 mg/dL    UREA NITROGEN (BUN) 20 7 - 25 mg/dL    CREATININE 1.14 0.70 - 1.28 mg/dL    EGFR 69 > OR = 60 mL/min/1.73m2    BUN/CREATININE RATIO SEE NOTE: 6 - 22 (calc)    SODIUM 136 135 - 146 mmol/L    POTASSIUM 5.0 3.5 - 5.3 mmol/L    CHLORIDE 104 98 - 110 mmol/L    CARBON DIOXIDE 22 20 - 32 mmol/L    ELECTROLYTE BALANCE 10 7 - 17 mmol/L (calc)    CALCIUM 9.2 8.6 - 10.3 mg/dL   Hemoglobin A1C    Collection Time: 07/14/25 10:23 AM   Result Value Ref Range    HEMOGLOBIN A1c 6.8 (H) <5.7 %    eAG (mg/dL) 148 mg/dL    eAG (mmol/L) 8.2 mmol/L       Assessment & Plan  Secondary hypertension  - Blood pressure remains under adequate control so we will continue with his current antihypertensive regimen    Orders:    amLODIPine (Norvasc) 2.5 mg tablet; Take 1 tablet (2.5 mg) by mouth once daily.    lisinopril 40 mg tablet; Take 1 tablet (40 mg) by mouth once daily.    Mixed hyperlipidemia  - Cholesterol profile was satisfactory range and we will check this again in 1 year per Medicare protocol    Orders:    lovastatin (Mevacor) 20 mg tablet; Take 1 tablet (20 mg) by mouth once daily at bedtime.    Spinal stenosis at L4-L5 level         Neurogenic claudication due to lumbar spinal stenosis  - He will continue with back stretching exercises         Lumbar radiculopathy         Medicare annual wellness visit, subsequent  - We discussed the importance of routine exercise  -We discussed fall prevention  -We discussed completing advanced directives including living will and power of  for healthcare         Type 2 diabetes mellitus without complication, without long-term current use of insulin  - Hemoglobin A1c is up to 6.8.  -We will make no changes in medication and he will be working aggressively with dietary modification and exercise  -We will recheck in 6 months    Orders:    Basic Metabolic Panel; Future    Hemoglobin A1C; Future  Patient instructions  Please remember  to put the grab bars in your bathroom.  Please also complete your living will and power of  for healthcare.  We would like a copy for your chart here  Please remember to try to average 30 minutes of aerobic activity 5 days a week  I sent refills for all of your medications and please let us know if there are any issues with the pharmacy  We invite you to return in 6 months and please remember to get fasting lab work done just prior to that visit

## 2025-07-15 NOTE — ASSESSMENT & PLAN NOTE
- Hemoglobin A1c is up to 6.8.  -We will make no changes in medication and he will be working aggressively with dietary modification and exercise  -We will recheck in 6 months    Orders:    Basic Metabolic Panel; Future    Hemoglobin A1C; Future  Patient instructions  Please remember to put the grab bars in your bathroom.  Please also complete your living will and power of  for healthcare.  We would like a copy for your chart here  Please remember to try to average 30 minutes of aerobic activity 5 days a week  I sent refills for all of your medications and please let us know if there are any issues with the pharmacy  We invite you to return in 6 months and please remember to get fasting lab work done just prior to that visit

## 2025-07-15 NOTE — ASSESSMENT & PLAN NOTE
- Blood pressure remains under adequate control so we will continue with his current antihypertensive regimen    Orders:    amLODIPine (Norvasc) 2.5 mg tablet; Take 1 tablet (2.5 mg) by mouth once daily.    lisinopril 40 mg tablet; Take 1 tablet (40 mg) by mouth once daily.

## 2025-07-15 NOTE — PATIENT INSTRUCTIONS
Patient instructions  Please remember to put the grab bars in your bathroom.  Please also complete your living will and power of  for healthcare.  We would like a copy for your chart here  Please remember to try to average 30 minutes of aerobic activity 5 days a week  I sent refills for all of your medications and please let us know if there are any issues with the pharmacy  We invite you to return in 6 months and please remember to get fasting lab work done just prior to that visit

## 2025-07-15 NOTE — ASSESSMENT & PLAN NOTE
- We discussed the importance of routine exercise  -We discussed fall prevention  -We discussed completing advanced directives including living will and power of  for healthcare

## 2025-07-15 NOTE — ASSESSMENT & PLAN NOTE
- Cholesterol profile was satisfactory range and we will check this again in 1 year per Medicare protocol    Orders:    lovastatin (Mevacor) 20 mg tablet; Take 1 tablet (20 mg) by mouth once daily at bedtime.

## 2026-01-20 ENCOUNTER — APPOINTMENT (OUTPATIENT)
Age: 71
End: 2026-01-20
Payer: MEDICARE